# Patient Record
Sex: MALE | Race: OTHER | HISPANIC OR LATINO | Employment: FULL TIME | URBAN - NONMETROPOLITAN AREA
[De-identification: names, ages, dates, MRNs, and addresses within clinical notes are randomized per-mention and may not be internally consistent; named-entity substitution may affect disease eponyms.]

---

## 2022-01-05 ENCOUNTER — HOSPITAL ENCOUNTER (EMERGENCY)
Facility: HOSPITAL | Age: 22
Discharge: HOME | End: 2022-01-05
Attending: EMERGENCY MEDICINE | Admitting: EMERGENCY MEDICINE
Payer: COMMERCIAL

## 2022-01-05 ENCOUNTER — APPOINTMENT (EMERGENCY)
Dept: CT IMAGING | Facility: HOSPITAL | Age: 22
End: 2022-01-05
Payer: COMMERCIAL

## 2022-01-05 VITALS
WEIGHT: 180 LBS | BODY MASS INDEX: 29.99 KG/M2 | HEIGHT: 65 IN | TEMPERATURE: 98.2 F | RESPIRATION RATE: 20 BRPM | SYSTOLIC BLOOD PRESSURE: 130 MMHG | DIASTOLIC BLOOD PRESSURE: 87 MMHG | HEART RATE: 51 BPM | OXYGEN SATURATION: 99 %

## 2022-01-05 DIAGNOSIS — N13.30 HYDROURETERONEPHROSIS: Primary | ICD-10-CM

## 2022-01-05 DIAGNOSIS — N39.0 UTI (URINARY TRACT INFECTION): ICD-10-CM

## 2022-01-05 DIAGNOSIS — N20.1 BILATERAL URETERAL CALCULI: ICD-10-CM

## 2022-01-05 LAB
ANION GAP SERPL CALCULATED.3IONS-SCNC: 12 MMOL/L (ref 4–13)
ANISOCYTOSIS BLD QL SMEAR: PRESENT
BACTERIA UR QL AUTO: ABNORMAL /HPF
BASOPHILS # BLD MANUAL: 0 THOUSAND/UL (ref 0–0.1)
BASOPHILS NFR MAR MANUAL: 0 % (ref 0–1)
BILIRUB UR QL STRIP: NEGATIVE
BUN SERPL-MCNC: 16 MG/DL (ref 5–25)
CALCIUM SERPL-MCNC: 9 MG/DL (ref 8.3–10.1)
CHLORIDE SERPL-SCNC: 105 MMOL/L (ref 100–108)
CLARITY UR: ABNORMAL
CO2 SERPL-SCNC: 24 MMOL/L (ref 21–32)
COLOR UR: YELLOW
CREAT SERPL-MCNC: 1.37 MG/DL (ref 0.6–1.3)
EOSINOPHIL # BLD MANUAL: 0 THOUSAND/UL (ref 0–0.4)
EOSINOPHIL NFR BLD MANUAL: 0 % (ref 0–6)
ERYTHROCYTE [DISTWIDTH] IN BLOOD BY AUTOMATED COUNT: 11.9 % (ref 11.6–15.1)
GFR SERPL CREATININE-BSD FRML MDRD: 72 ML/MIN/1.73SQ M
GLUCOSE SERPL-MCNC: 91 MG/DL (ref 65–140)
GLUCOSE UR STRIP-MCNC: NEGATIVE MG/DL
HCT VFR BLD AUTO: 42.5 % (ref 36.5–49.3)
HGB BLD-MCNC: 14.8 G/DL (ref 12–17)
HGB UR QL STRIP.AUTO: ABNORMAL
KETONES UR STRIP-MCNC: NEGATIVE MG/DL
LEUKOCYTE ESTERASE UR QL STRIP: NEGATIVE
LYMPHOCYTES # BLD AUTO: 1.73 THOUSAND/UL (ref 0.6–4.47)
LYMPHOCYTES # BLD AUTO: 19 % (ref 14–44)
MCH RBC QN AUTO: 30.1 PG (ref 26.8–34.3)
MCHC RBC AUTO-ENTMCNC: 34.8 G/DL (ref 31.4–37.4)
MCV RBC AUTO: 86 FL (ref 82–98)
MONOCYTES # BLD AUTO: 1.55 THOUSAND/UL (ref 0–1.22)
MONOCYTES NFR BLD: 17 % (ref 4–12)
MUCOUS THREADS UR QL AUTO: ABNORMAL
NEUTROPHILS # BLD MANUAL: 5.37 THOUSAND/UL (ref 1.85–7.62)
NEUTS BAND NFR BLD MANUAL: 1 % (ref 0–8)
NEUTS SEG NFR BLD AUTO: 58 % (ref 43–75)
NITRITE UR QL STRIP: NEGATIVE
NON-SQ EPI CELLS URNS QL MICRO: ABNORMAL /HPF
PH UR STRIP.AUTO: 6.5 [PH]
PLATELET # BLD AUTO: 246 THOUSANDS/UL (ref 149–390)
PLATELET BLD QL SMEAR: ADEQUATE
PMV BLD AUTO: 9.2 FL (ref 8.9–12.7)
POTASSIUM SERPL-SCNC: 3.3 MMOL/L (ref 3.5–5.3)
PROT UR STRIP-MCNC: ABNORMAL MG/DL
RBC # BLD AUTO: 4.92 MILLION/UL (ref 3.88–5.62)
RBC #/AREA URNS AUTO: ABNORMAL /HPF
SODIUM SERPL-SCNC: 141 MMOL/L (ref 136–145)
SP GR UR STRIP.AUTO: 1.02 (ref 1–1.03)
UROBILINOGEN UR QL STRIP.AUTO: 0.2 E.U./DL
VARIANT LYMPHS # BLD AUTO: 5 %
WBC # BLD AUTO: 9.11 THOUSAND/UL (ref 4.31–10.16)
WBC #/AREA URNS AUTO: ABNORMAL /HPF

## 2022-01-05 PROCEDURE — 81001 URINALYSIS AUTO W/SCOPE: CPT | Performed by: PHYSICIAN ASSISTANT

## 2022-01-05 PROCEDURE — 96361 HYDRATE IV INFUSION ADD-ON: CPT

## 2022-01-05 PROCEDURE — 99284 EMERGENCY DEPT VISIT MOD MDM: CPT | Performed by: PHYSICIAN ASSISTANT

## 2022-01-05 PROCEDURE — 36415 COLL VENOUS BLD VENIPUNCTURE: CPT | Performed by: PHYSICIAN ASSISTANT

## 2022-01-05 PROCEDURE — 85007 BL SMEAR W/DIFF WBC COUNT: CPT | Performed by: PHYSICIAN ASSISTANT

## 2022-01-05 PROCEDURE — G1004 CDSM NDSC: HCPCS

## 2022-01-05 PROCEDURE — 96375 TX/PRO/DX INJ NEW DRUG ADDON: CPT

## 2022-01-05 PROCEDURE — 80048 BASIC METABOLIC PNL TOTAL CA: CPT | Performed by: PHYSICIAN ASSISTANT

## 2022-01-05 PROCEDURE — 99284 EMERGENCY DEPT VISIT MOD MDM: CPT

## 2022-01-05 PROCEDURE — 85027 COMPLETE CBC AUTOMATED: CPT | Performed by: PHYSICIAN ASSISTANT

## 2022-01-05 PROCEDURE — 74176 CT ABD & PELVIS W/O CONTRAST: CPT

## 2022-01-05 PROCEDURE — 96365 THER/PROPH/DIAG IV INF INIT: CPT

## 2022-01-05 RX ORDER — TAMSULOSIN HYDROCHLORIDE 0.4 MG/1
0.4 CAPSULE ORAL ONCE
Status: COMPLETED | OUTPATIENT
Start: 2022-01-05 | End: 2022-01-05

## 2022-01-05 RX ORDER — CEPHALEXIN 500 MG/1
500 CAPSULE ORAL EVERY 6 HOURS SCHEDULED
Qty: 28 CAPSULE | Refills: 0 | Status: SHIPPED | OUTPATIENT
Start: 2022-01-05 | End: 2022-01-12

## 2022-01-05 RX ORDER — TAMSULOSIN HYDROCHLORIDE 0.4 MG/1
0.4 CAPSULE ORAL
Qty: 4 CAPSULE | Refills: 0 | Status: ON HOLD | OUTPATIENT
Start: 2022-01-05 | End: 2022-01-24 | Stop reason: ALTCHOICE

## 2022-01-05 RX ORDER — OXYCODONE HYDROCHLORIDE AND ACETAMINOPHEN 5; 325 MG/1; MG/1
1 TABLET ORAL EVERY 4 HOURS PRN
Qty: 15 TABLET | Refills: 0 | Status: ON HOLD | OUTPATIENT
Start: 2022-01-05 | End: 2022-01-24 | Stop reason: ALTCHOICE

## 2022-01-05 RX ORDER — KETOROLAC TROMETHAMINE 30 MG/ML
30 INJECTION, SOLUTION INTRAMUSCULAR; INTRAVENOUS ONCE
Status: COMPLETED | OUTPATIENT
Start: 2022-01-05 | End: 2022-01-05

## 2022-01-05 RX ORDER — CEFTRIAXONE 1 G/50ML
1000 INJECTION, SOLUTION INTRAVENOUS ONCE
Status: COMPLETED | OUTPATIENT
Start: 2022-01-05 | End: 2022-01-05

## 2022-01-05 RX ADMIN — KETOROLAC TROMETHAMINE 30 MG: 30 INJECTION, SOLUTION INTRAMUSCULAR; INTRAVENOUS at 14:14

## 2022-01-05 RX ADMIN — TAMSULOSIN HYDROCHLORIDE 0.4 MG: 0.4 CAPSULE ORAL at 15:39

## 2022-01-05 RX ADMIN — CEFTRIAXONE 1000 MG: 1 INJECTION, SOLUTION INTRAVENOUS at 15:39

## 2022-01-05 RX ADMIN — SODIUM CHLORIDE 1000 ML: 0.9 INJECTION, SOLUTION INTRAVENOUS at 13:37

## 2022-01-05 NOTE — ED PROVIDER NOTES
History  Chief Complaint   Patient presents with    Flank Pain     left flank pain; sharp shooting x1 week; ibuprofen taken with no relief; hx of kidney stones      Patient presents to the emergency department today for evaluation of left-sided flank pain and left lower quadrant abdominal pain  This is a very pleasant 42-year-old male who states about a week ago he began with the left-sided flank pain radiating to left side of the abdomen, intermittent, initially associated with nausea fevers however those symptoms resolved  He did admit to a few drops of blood in the urine and dark urine  He also feels though he may need moderately constipated  Self-treated with ibuprofen with no relief  He does have a history of renal calculi prior that required stenting  None       Past Medical History:   Diagnosis Date    Kidney stones        Past Surgical History:   Procedure Laterality Date    KIDNEY STONE SURGERY         History reviewed  No pertinent family history  I have reviewed and agree with the history as documented  E-Cigarette/Vaping     E-Cigarette/Vaping Substances     Social History     Tobacco Use    Smoking status: Current Every Day Smoker     Packs/day: 0 50     Types: Cigarettes    Smokeless tobacco: Never Used   Substance Use Topics    Alcohol use: Yes    Drug use: Not Currently       Review of Systems   Constitutional: Negative for chills and fever  HENT: Negative for ear pain and sore throat  Eyes: Negative for pain and visual disturbance  Respiratory: Negative for cough and shortness of breath  Cardiovascular: Negative for chest pain and palpitations  Gastrointestinal: Positive for abdominal pain and constipation  Negative for vomiting  Genitourinary: Positive for flank pain and hematuria  Negative for dysuria  Musculoskeletal: Positive for back pain  Negative for arthralgias  Skin: Negative for color change and rash     Neurological: Negative for seizures and syncope  All other systems reviewed and are negative  Physical Exam  Physical Exam  Vitals and nursing note reviewed  Constitutional:       General: He is not in acute distress  Appearance: Normal appearance  He is well-developed and normal weight  He is not ill-appearing, toxic-appearing or diaphoretic  HENT:      Head: Normocephalic and atraumatic  Eyes:      Conjunctiva/sclera: Conjunctivae normal    Cardiovascular:      Rate and Rhythm: Normal rate and regular rhythm  Heart sounds: No murmur heard  No friction rub  No gallop  Pulmonary:      Effort: Pulmonary effort is normal  No respiratory distress  Breath sounds: Normal breath sounds  No stridor  No wheezing, rhonchi or rales  Chest:      Chest wall: No tenderness  Abdominal:      General: There is no distension  Palpations: Abdomen is soft  There is no mass  Tenderness: There is no abdominal tenderness  There is left CVA tenderness  There is no right CVA tenderness  Musculoskeletal:         General: No deformity or signs of injury  Normal range of motion  Cervical back: Neck supple  Skin:     General: Skin is warm and dry  Neurological:      General: No focal deficit present  Mental Status: He is alert and oriented to person, place, and time  Mental status is at baseline  Psychiatric:         Mood and Affect: Mood normal          Behavior: Behavior normal          Thought Content:  Thought content normal          Judgment: Judgment normal          Vital Signs  ED Triage Vitals [01/05/22 1143]   Temperature Pulse Respirations Blood Pressure SpO2   98 2 °F (36 8 °C) 65 20 126/76 99 %      Temp Source Heart Rate Source Patient Position - Orthostatic VS BP Location FiO2 (%)   Temporal Monitor Sitting Right arm --      Pain Score       10 - Worst Possible Pain           Vitals:    01/05/22 1143 01/05/22 1450   BP: 126/76 130/87   Pulse: 65 (!) 51   Patient Position - Orthostatic VS: Sitting Lying Visual Acuity      ED Medications  Medications   tamsulosin (FLOMAX) capsule 0 4 mg (has no administration in time range)   cefTRIAXone (ROCEPHIN) IVPB (premix in dextrose) 1,000 mg 50 mL (has no administration in time range)   sodium chloride 0 9 % bolus 1,000 mL (0 mL Intravenous Stopped 1/5/22 1452)   ketorolac (TORADOL) injection 30 mg (30 mg Intravenous Given 1/5/22 1414)       Diagnostic Studies  Results Reviewed     Procedure Component Value Units Date/Time    Manual Differential(PHLEBS Do Not Order) [[id number]]  (Abnormal) Collected: 01/05/22 1336    Lab Status: Final result Specimen: Blood from Arm, Left Updated: 01/05/22 1409     Segmented % 58 %      Bands % 1 %      Lymphocytes % 19 %      Monocytes % 17 %      Eosinophils, % 0 %      Basophils % 0 %      Atypical Lymphocytes % 5 %      Absolute Neutrophils 5 37 Thousand/uL      Lymphocytes Absolute 1 73 Thousand/uL      Monocytes Absolute 1 55 Thousand/uL      Eosinophils Absolute 0 00 Thousand/uL      Basophils Absolute 0 00 Thousand/uL      Total Counted --     Anisocytosis Present     Platelet Estimate Adequate    CBC and differential [[id number]]  (Normal) Collected: 01/05/22 1336    Lab Status: Final result Specimen: Blood from Arm, Left Updated: 01/05/22 1409     WBC 9 11 Thousand/uL      RBC 4 92 Million/uL      Hemoglobin 14 8 g/dL      Hematocrit 42 5 %      MCV 86 fL      MCH 30 1 pg      MCHC 34 8 g/dL      RDW 11 9 %      MPV 9 2 fL      Platelets 120 Thousands/uL     Narrative: This is an appended report  These results have been appended to a previously verified report      Urine Microscopic [[id number]]  (Abnormal) Collected: 01/05/22 1338    Lab Status: Final result Specimen: Urine, Clean Catch Updated: 01/05/22 1400     RBC, UA 30-50 /hpf      WBC, UA 4-10 /hpf      Epithelial Cells None Seen /hpf      Bacteria, UA Moderate /hpf      MUCUS THREADS Moderate    Basic metabolic panel [[id number]]  (Abnormal) Collected: 01/05/22 1336    Lab Status: Final result Specimen: Blood from Arm, Left Updated: 01/05/22 1354     Sodium 141 mmol/L      Potassium 3 3 mmol/L      Chloride 105 mmol/L      CO2 24 mmol/L      ANION GAP 12 mmol/L      BUN 16 mg/dL      Creatinine 1 37 mg/dL      Glucose 91 mg/dL      Calcium 9 0 mg/dL      eGFR 72 ml/min/1 73sq m     Narrative:      National Kidney Disease Foundation guidelines for Chronic Kidney Disease (CKD):     Stage 1 with normal or high GFR (GFR > 90 mL/min/1 73 square meters)    Stage 2 Mild CKD (GFR = 60-89 mL/min/1 73 square meters)    Stage 3A Moderate CKD (GFR = 45-59 mL/min/1 73 square meters)    Stage 3B Moderate CKD (GFR = 30-44 mL/min/1 73 square meters)    Stage 4 Severe CKD (GFR = 15-29 mL/min/1 73 square meters)    Stage 5 End Stage CKD (GFR <15 mL/min/1 73 square meters)  Note: GFR calculation is accurate only with a steady state creatinine    UA w Reflex to Microscopic w Reflex to Culture [[id number]]  (Abnormal) Collected: 01/05/22 1338    Lab Status: Final result Specimen: Urine, Clean Catch Updated: 01/05/22 1346     Color, UA Yellow     Clarity, UA Cloudy     Specific Gravity, UA 1 025     pH, UA 6 5     Leukocytes, UA Negative     Nitrite, UA Negative     Protein, UA Trace mg/dl      Glucose, UA Negative mg/dl      Ketones, UA Negative mg/dl      Urobilinogen, UA 0 2 E U /dl      Bilirubin, UA Negative     Blood, UA Large                 CT renal stone study abdomen pelvis wo contrast   Final Result by Seth Epperson MD (01/05 1510)      Obstructive 6 and 3 mm left ureteral calculi causing moderate to severe left hydroureteronephrosis  Obstructive 5 mm proximal right ureteral calculus causing mild right hydronephrosis  The study was marked in [facility] for immediate notification              Workstation performed: [id number]                    Procedures  Procedures         ED Course  ED Course as of 01/05/22 1534   Wed Jan 05, 2022   1344 WBC: 9 11   1344 Hemoglobin: 14 8   1344 Platelet Count: 176   1413 MUCUS THREADS(!): Moderate   1413 Bacteria, UA(!): Moderate   1413 Epithelial Cells: None Seen   1413 WBC, UA(!): 4-10   1520   IMPRESSION:     Obstructive 6 and 3 mm left ureteral calculi causing moderate to severe left hydroureteronephrosis      Obstructive 5 mm proximal right ureteral calculus causing mild right hydronephrosis  1521 Bacteria, UA(!): Moderate   1521 WBC, UA(!): 4-10   1521 I updated the patient on the diagnosis, there is some bacteria as well as 3 separate ureteral calculi, at this point he is nontoxic appearing and pain is controlled here in the ER he is agreeable to outpatient management he knows that if the symptoms worsen he will return to the ER likely will require stent  MDM    Disposition  Final diagnoses:   Hydroureteronephrosis   Bilateral ureteral calculi   UTI (urinary tract infection)     Time reflects when diagnosis was documented in both MDM as applicable and the Disposition within this note     Time User Action Codes Description Comment    1/5/2022  3:31 PM Klaudia Youssefer Add [N13 30] Hydroureteronephrosis     1/5/2022  3:31 PM Sharlotte Semen D Add [N20 1] Bilateral ureteral calculi     1/5/2022  3:31 PM Sharlotte Semen D Add [N39 0] UTI (urinary tract infection)       ED Disposition     ED Disposition Condition Date/Time Comment    Discharge Stable Wed Jan 5, 2022  3:31 PM [place] Lighter discharge to home/self care              Follow-up Information     Follow up With Specialties Details Why Contact Info Additional [address] [facility] [address] [place] [facility] Urology Call today  [address] [phone]  [phone]  [facility], [address] [place] [address], [address] [place] [address]          Patient's Medications   Discharge Prescriptions    CEPHALEXIN (KEFLEX) 500 MG CAPSULE    Take 1 capsule (500 mg total) by mouth every 6 (six) hours for 7 days       Start Date: 1/5/2022  End Date: 1/12/2022       Order Dose: 500 mg       Quantity: 28 capsule    Refills: 0    OXYCODONE-ACETAMINOPHEN (PERCOCET) 5-325 MG PER TABLET    Take 1 tablet by mouth every 4 (four) hours as needed for moderate pain Max Daily Amount: 6 tablets       Start Date: 1/5/2022  End Date: --       Order Dose: 1 tablet       Quantity: 15 tablet    Refills: 0    TAMSULOSIN (FLOMAX) 0 4 MG    Take 1 capsule (0 4 mg total) by mouth daily with dinner       Start Date: 1/5/2022  End Date: --       Order Dose: 0 4 mg       Quantity: 4 capsule    Refills: 0       No discharge procedures on file      PDMP Review     None          ED Provider  Electronically Signed by           Navarro Kerr PA-C  01/05/22 3659

## 2022-01-23 ENCOUNTER — HOSPITAL ENCOUNTER (EMERGENCY)
Facility: HOSPITAL | Age: 22
Discharge: ANOTHER HOSPITAL | End: 2022-01-24
Attending: EMERGENCY MEDICINE
Payer: COMMERCIAL

## 2022-01-23 ENCOUNTER — APPOINTMENT (EMERGENCY)
Dept: CT IMAGING | Facility: HOSPITAL | Age: 22
End: 2022-01-23
Payer: COMMERCIAL

## 2022-01-23 VITALS
HEIGHT: 65 IN | TEMPERATURE: 97.7 F | HEART RATE: 62 BPM | SYSTOLIC BLOOD PRESSURE: 121 MMHG | DIASTOLIC BLOOD PRESSURE: 58 MMHG | OXYGEN SATURATION: 96 % | RESPIRATION RATE: 18 BRPM | BODY MASS INDEX: 29.97 KG/M2 | WEIGHT: 179.9 LBS

## 2022-01-23 DIAGNOSIS — N13.30 HYDROURETERONEPHROSIS: ICD-10-CM

## 2022-01-23 DIAGNOSIS — D72.829 LEUKOCYTOSIS: ICD-10-CM

## 2022-01-23 DIAGNOSIS — N17.9 ACUTE KIDNEY INJURY (HCC): Primary | ICD-10-CM

## 2022-01-23 DIAGNOSIS — N20.1 URETERAL CALCULI: ICD-10-CM

## 2022-01-23 PROBLEM — N13.2 HYDRONEPHROSIS WITH URINARY OBSTRUCTION DUE TO RENAL CALCULUS: Status: ACTIVE | Noted: 2022-01-23

## 2022-01-23 PROBLEM — N39.0 UTI (URINARY TRACT INFECTION): Status: ACTIVE | Noted: 2022-01-23

## 2022-01-23 LAB
ANION GAP SERPL CALCULATED.3IONS-SCNC: 10 MMOL/L (ref 4–13)
ANION GAP SERPL CALCULATED.3IONS-SCNC: 9 MMOL/L (ref 4–13)
BACTERIA UR QL AUTO: ABNORMAL /HPF
BASOPHILS # BLD AUTO: 0.03 THOUSANDS/ΜL (ref 0–0.1)
BASOPHILS NFR BLD AUTO: 0 % (ref 0–1)
BILIRUB UR QL STRIP: NEGATIVE
BUN SERPL-MCNC: 24 MG/DL (ref 5–25)
BUN SERPL-MCNC: 26 MG/DL (ref 5–25)
CALCIUM SERPL-MCNC: 8.4 MG/DL (ref 8.3–10.1)
CALCIUM SERPL-MCNC: 9.2 MG/DL (ref 8.3–10.1)
CHLORIDE SERPL-SCNC: 100 MMOL/L (ref 100–108)
CHLORIDE SERPL-SCNC: 97 MMOL/L (ref 100–108)
CLARITY UR: CLEAR
CO2 SERPL-SCNC: 24 MMOL/L (ref 21–32)
CO2 SERPL-SCNC: 26 MMOL/L (ref 21–32)
COLOR UR: YELLOW
CREAT SERPL-MCNC: 1.71 MG/DL (ref 0.6–1.3)
CREAT SERPL-MCNC: 2.03 MG/DL (ref 0.6–1.3)
EOSINOPHIL # BLD AUTO: 0.01 THOUSAND/ΜL (ref 0–0.61)
EOSINOPHIL NFR BLD AUTO: 0 % (ref 0–6)
ERYTHROCYTE [DISTWIDTH] IN BLOOD BY AUTOMATED COUNT: 12.8 % (ref 11.6–15.1)
GFR SERPL CREATININE-BSD FRML MDRD: 44 ML/MIN/1.73SQ M
GFR SERPL CREATININE-BSD FRML MDRD: 55 ML/MIN/1.73SQ M
GLUCOSE SERPL-MCNC: 104 MG/DL (ref 65–140)
GLUCOSE SERPL-MCNC: 112 MG/DL (ref 65–140)
GLUCOSE UR STRIP-MCNC: NEGATIVE MG/DL
HCT VFR BLD AUTO: 38.5 % (ref 36.5–49.3)
HGB BLD-MCNC: 13.1 G/DL (ref 12–17)
HGB UR QL STRIP.AUTO: ABNORMAL
IMM GRANULOCYTES # BLD AUTO: 0.13 THOUSAND/UL (ref 0–0.2)
IMM GRANULOCYTES NFR BLD AUTO: 1 % (ref 0–2)
KETONES UR STRIP-MCNC: NEGATIVE MG/DL
LACTATE SERPL-SCNC: 1.4 MMOL/L (ref 0.5–2)
LEUKOCYTE ESTERASE UR QL STRIP: NEGATIVE
LYMPHOCYTES # BLD AUTO: 1.06 THOUSANDS/ΜL (ref 0.6–4.47)
LYMPHOCYTES NFR BLD AUTO: 5 % (ref 14–44)
MCH RBC QN AUTO: 30.3 PG (ref 26.8–34.3)
MCHC RBC AUTO-ENTMCNC: 34 G/DL (ref 31.4–37.4)
MCV RBC AUTO: 89 FL (ref 82–98)
MONOCYTES # BLD AUTO: 1.45 THOUSAND/ΜL (ref 0.17–1.22)
MONOCYTES NFR BLD AUTO: 6 % (ref 4–12)
MUCOUS THREADS UR QL AUTO: ABNORMAL
NEUTROPHILS # BLD AUTO: 19.91 THOUSANDS/ΜL (ref 1.85–7.62)
NEUTS SEG NFR BLD AUTO: 88 % (ref 43–75)
NITRITE UR QL STRIP: NEGATIVE
NON-SQ EPI CELLS URNS QL MICRO: ABNORMAL /HPF
NRBC BLD AUTO-RTO: 0 /100 WBCS
PH UR STRIP.AUTO: 6 [PH]
PLATELET # BLD AUTO: 315 THOUSANDS/UL (ref 149–390)
PMV BLD AUTO: 9.3 FL (ref 8.9–12.7)
POTASSIUM SERPL-SCNC: 3.8 MMOL/L (ref 3.5–5.3)
POTASSIUM SERPL-SCNC: 3.8 MMOL/L (ref 3.5–5.3)
PROT UR STRIP-MCNC: NEGATIVE MG/DL
RBC # BLD AUTO: 4.33 MILLION/UL (ref 3.88–5.62)
RBC #/AREA URNS AUTO: ABNORMAL /HPF
SODIUM SERPL-SCNC: 132 MMOL/L (ref 136–145)
SODIUM SERPL-SCNC: 134 MMOL/L (ref 136–145)
SP GR UR STRIP.AUTO: 1.01 (ref 1–1.03)
UROBILINOGEN UR QL STRIP.AUTO: 0.2 E.U./DL
WBC # BLD AUTO: 22.59 THOUSAND/UL (ref 4.31–10.16)
WBC #/AREA URNS AUTO: ABNORMAL /HPF

## 2022-01-23 PROCEDURE — 96361 HYDRATE IV INFUSION ADD-ON: CPT

## 2022-01-23 PROCEDURE — 85025 COMPLETE CBC W/AUTO DIFF WBC: CPT | Performed by: PHYSICIAN ASSISTANT

## 2022-01-23 PROCEDURE — 99285 EMERGENCY DEPT VISIT HI MDM: CPT

## 2022-01-23 PROCEDURE — G1004 CDSM NDSC: HCPCS

## 2022-01-23 PROCEDURE — 83605 ASSAY OF LACTIC ACID: CPT | Performed by: PHYSICIAN ASSISTANT

## 2022-01-23 PROCEDURE — 99284 EMERGENCY DEPT VISIT MOD MDM: CPT | Performed by: PHYSICIAN ASSISTANT

## 2022-01-23 PROCEDURE — 96365 THER/PROPH/DIAG IV INF INIT: CPT

## 2022-01-23 PROCEDURE — 74176 CT ABD & PELVIS W/O CONTRAST: CPT

## 2022-01-23 PROCEDURE — 80048 BASIC METABOLIC PNL TOTAL CA: CPT | Performed by: PHYSICIAN ASSISTANT

## 2022-01-23 PROCEDURE — 81001 URINALYSIS AUTO W/SCOPE: CPT | Performed by: PHYSICIAN ASSISTANT

## 2022-01-23 PROCEDURE — 96375 TX/PRO/DX INJ NEW DRUG ADDON: CPT

## 2022-01-23 PROCEDURE — 36415 COLL VENOUS BLD VENIPUNCTURE: CPT | Performed by: PHYSICIAN ASSISTANT

## 2022-01-23 RX ORDER — ONDANSETRON 2 MG/ML
4 INJECTION INTRAMUSCULAR; INTRAVENOUS ONCE
Status: COMPLETED | OUTPATIENT
Start: 2022-01-23 | End: 2022-01-23

## 2022-01-23 RX ORDER — CEFTRIAXONE 1 G/50ML
1000 INJECTION, SOLUTION INTRAVENOUS ONCE
Status: COMPLETED | OUTPATIENT
Start: 2022-01-23 | End: 2022-01-23

## 2022-01-23 RX ORDER — HYDROMORPHONE HCL/PF 1 MG/ML
1 SYRINGE (ML) INJECTION ONCE
Status: COMPLETED | OUTPATIENT
Start: 2022-01-23 | End: 2022-01-23

## 2022-01-23 RX ORDER — KETOROLAC TROMETHAMINE 30 MG/ML
30 INJECTION, SOLUTION INTRAMUSCULAR; INTRAVENOUS ONCE
Status: COMPLETED | OUTPATIENT
Start: 2022-01-23 | End: 2022-01-23

## 2022-01-23 RX ORDER — TAMSULOSIN HYDROCHLORIDE 0.4 MG/1
0.4 CAPSULE ORAL ONCE
Status: COMPLETED | OUTPATIENT
Start: 2022-01-23 | End: 2022-01-23

## 2022-01-23 RX ORDER — SODIUM CHLORIDE 9 MG/ML
125 INJECTION, SOLUTION INTRAVENOUS CONTINUOUS
Status: DISCONTINUED | OUTPATIENT
Start: 2022-01-23 | End: 2022-01-24 | Stop reason: HOSPADM

## 2022-01-23 RX ADMIN — HYDROMORPHONE HYDROCHLORIDE 1 MG: 1 INJECTION, SOLUTION INTRAMUSCULAR; INTRAVENOUS; SUBCUTANEOUS at 16:52

## 2022-01-23 RX ADMIN — ONDANSETRON 4 MG: 2 INJECTION INTRAMUSCULAR; INTRAVENOUS at 16:52

## 2022-01-23 RX ADMIN — SODIUM CHLORIDE 1000 ML: 0.9 INJECTION, SOLUTION INTRAVENOUS at 16:52

## 2022-01-23 RX ADMIN — SODIUM CHLORIDE 1000 ML: 0.9 INJECTION, SOLUTION INTRAVENOUS at 17:49

## 2022-01-23 RX ADMIN — TAMSULOSIN HYDROCHLORIDE 0.4 MG: 0.4 CAPSULE ORAL at 16:53

## 2022-01-23 RX ADMIN — KETOROLAC TROMETHAMINE 30 MG: 30 INJECTION, SOLUTION INTRAMUSCULAR at 16:52

## 2022-01-23 RX ADMIN — SODIUM CHLORIDE 125 ML/HR: 0.9 INJECTION, SOLUTION INTRAVENOUS at 19:46

## 2022-01-23 RX ADMIN — CEFTRIAXONE 1000 MG: 1 INJECTION, SOLUTION INTRAVENOUS at 17:49

## 2022-01-23 NOTE — QUICK NOTE
Contacted by [facility] about this patient  19year old with recurrent stones  5,mm right proximal stone with perinephric stranding and leukocytosis  Afebrile and hemodynamically stable per report  Patient will require admission for cystoscopy and stent placement  I have requested transfer to our [facility]  I recommend that the patient be transported tonight    I have scheduled him for stent placement tomorrow 1/24 at 07:30    [facility] admission    If the patient becomes febrile or demonstrates concerning hemodynamics, please contact me overnight and I will plan to stent him immediately    SHould remain NPO after midnight    Full consult to follow

## 2022-01-23 NOTE — EMTALA/ACUTE CARE TRANSFER
[address] [facility] EMERGENCY DEPARTMENT  [address] [place] [id number]  Dept: [phone]      EMTALA TRANSFER CONSENT    NAME Syed Ac                                         2000                              MRN [id number]    I have been informed of my rights regarding examination, treatment, and transfer   by Dr Wilber Darden DO    Benefits:  urology    Risks:  Transport Risks      Consent for Transfer:  I acknowledge that my medical condition has been evaluated and explained to me by the emergency department physician or other qualified medical person and/or my attending physician, who has recommended that I be transferred to the service of   Dr Joseph Soliz at  [facility] AND [facility]  The above potential benefits of such transfer, the potential risks associated with such transfer, and the probable risks of not being transferred have been explained to me, and I fully understand them  The doctor has explained that, in my case, the benefits of transfer outweigh the risks  I agree to be transferred  I authorize the performance of emergency medical procedures and treatments upon me in both transit and upon arrival at the receiving facility  Additionally, I authorize the release of any and all medical records to the receiving facility and request they be transported with me, if possible  I understand that the safest mode of transportation during a medical emergency is an ambulance and that the Hospital advocates the use of this mode of transport  Risks of traveling to the receiving facility by car, including absence of medical control, life sustaining equipment, such as oxygen, and medical personnel has been explained to me and I fully understand them  (JONES CORRECT BOX BELOW)  [  ]  I consent to the stated transfer and to be transported by ambulance/helicopter    [  ]  I consent to the stated transfer, but refuse transportation by ambulance and accept full responsibility for my transportation by car  I understand the risks of non-ambulance transfers and I exonerate the Hospital and its staff from any deterioration in my condition that results from this refusal     X___________________________________________    DATE  22  TIME________  Signature of patient or legally responsible individual signing on patient behalf           RELATIONSHIP TO PATIENT_________________________          Provider Certification    NAME Umer Dubon                                         2000                              MRN [id number]    A medical screening exam was performed on the above named patient  Based on the examination:    Condition Necessitating Transfer The primary encounter diagnosis was Acute kidney injury ([facility] [address] )  Diagnoses of Leukocytosis, Hydroureteronephrosis, and Ureteral calculi were also pertinent to this visit  Patient Condition:  Stable    Reason for Transfer:  Urology    Transfer Requirements: Facility  \[facility]\""   · Space available and qualified personnel available for treatment as acknowledged by  Raquel Multani  · Agreed to accept transfer and to provide appropriate medical treatment as acknowledged by          · Appropriate medical records of the examination and treatment of the patient are provided at the time of transfer   [address], [address] _______  · Transfer will be performed by qualified personnel from    and appropriate transfer equipment as required, including the use of necessary and appropriate life support measures      Provider Certification: I have examined the patient and explained the following risks and benefits of being transferred/refusing transfer to the patient/family:         Based on these reasonable risks and benefits to the patient and/or the unborn child(marko), and based upon the information available at the time of the patients examination, I certify that the medical benefits reasonably to be expected from the provision of appropriate medical treatments at another medical facility outweigh the increasing risks, if any, to the individuals medical condition, and in the case of labor to the unborn child, from effecting the transfer      X____________________________________________ DATE 01/23/22        TIME_______      ORIGINAL - SEND TO MEDICAL RECORDS   COPY - SEND WITH PATIENT DURING TRANSFER

## 2022-01-23 NOTE — ED PROVIDER NOTES
History  Chief Complaint   Patient presents with    Flank Pain     pt complaining of R flank pain since last night, feels that he passed a stone, PMH of Kidny stones     Patient presents to the emergency department today for evaluation right-sided flank pain  This is a very pleasant 47-year-old male who presents via private vehicle with family  He states that he had a history of renal calculi few weeks ago, symptoms went away with some Flomax and pain medicine, he states yesterday the pain returned it is right-sided high in the flank associated with hematuria as well as nausea and vomiting  Prior to Admission Medications   Prescriptions Last Dose Informant Patient Reported? Taking?   oxyCODONE-acetaminophen (Percocet) 5-325 mg per tablet 1/23/2022 at Unknown time  No Yes   Sig: Take 1 tablet by mouth every 4 (four) hours as needed for moderate pain Max Daily Amount: 6 tablets   tamsulosin (FLOMAX) 0 4 mg 1/23/2022 at Unknown time  No Yes   Sig: Take 1 capsule (0 4 mg total) by mouth daily with dinner      Facility-Administered Medications: None       Past Medical History:   Diagnosis Date    Kidney stones        Past Surgical History:   Procedure Laterality Date    KIDNEY STONE SURGERY         History reviewed  No pertinent family history  I have reviewed and agree with the history as documented  E-Cigarette/Vaping     E-Cigarette/Vaping Substances     Social History     Tobacco Use    Smoking status: Current Every Day Smoker     Packs/day: 0 50     Types: Cigarettes    Smokeless tobacco: Never Used   Substance Use Topics    Alcohol use: Yes    Drug use: Not Currently     Types: Marijuana       Review of Systems   Constitutional: Negative for chills and fever  HENT: Negative for ear pain and sore throat  Eyes: Negative for pain and visual disturbance  Respiratory: Negative for cough and shortness of breath  Cardiovascular: Negative for chest pain and palpitations     Gastrointestinal: Positive for nausea and vomiting  Negative for abdominal pain  Genitourinary: Positive for flank pain and hematuria  Negative for dysuria  Musculoskeletal: Negative for arthralgias and back pain  Skin: Negative for color change and rash  Neurological: Negative for seizures and syncope  All other systems reviewed and are negative  Physical Exam  Physical Exam  Vitals reviewed  Constitutional:       General: He is not in acute distress  Appearance: He is well-developed  He is not ill-appearing or diaphoretic  HENT:      Right Ear: External ear normal  No swelling  Tympanic membrane is not bulging  Left Ear: External ear normal  No swelling  Tympanic membrane is not bulging  Nose: Nose normal       Mouth/Throat:      Pharynx: No oropharyngeal exudate  Eyes:      General: Lids are normal       Conjunctiva/sclera: Conjunctivae normal       Pupils: Pupils are equal, round, and reactive to light  Neck:      Thyroid: No thyromegaly  Vascular: No JVD  Trachea: No tracheal deviation  Cardiovascular:      Rate and Rhythm: Normal rate and regular rhythm  Pulses: Normal pulses  Heart sounds: Normal heart sounds  No murmur heard  No friction rub  No gallop  Pulmonary:      Effort: Pulmonary effort is normal  No respiratory distress  Breath sounds: Normal breath sounds  No stridor  No wheezing or rales  Chest:      Chest wall: No tenderness  Abdominal:      General: Bowel sounds are normal  There is no distension  Palpations: Abdomen is soft  There is no mass  Tenderness: There is no abdominal tenderness  There is right CVA tenderness  There is no guarding or rebound  Negative signs include Frank's sign  Hernia: No hernia is present  Musculoskeletal:         General: No tenderness  Normal range of motion  Cervical back: Normal range of motion and neck supple  No edema  Normal range of motion     Lymphadenopathy:      Cervical: No cervical adenopathy  Skin:     General: Skin is warm and dry  Capillary Refill: Capillary refill takes less than 2 seconds  Coloration: Skin is not pale  Findings: No erythema or rash  Neurological:      Mental Status: He is alert and oriented to person, place, and time  GCS: GCS eye subscore is 4  GCS verbal subscore is 5  GCS motor subscore is 6  Cranial Nerves: No cranial nerve deficit  Sensory: No sensory deficit  Deep Tendon Reflexes: Reflexes are normal and symmetric  Psychiatric:         Mood and Affect: Mood normal          Speech: Speech normal          Behavior: Behavior normal          Thought Content:  Thought content normal          Judgment: Judgment normal          Vital Signs  ED Triage Vitals   Temperature Pulse Respirations Blood Pressure SpO2   01/23/22 1614 01/23/22 1614 01/23/22 1614 01/23/22 1619 01/23/22 1614   97 7 °F (36 5 °C) 66 18 162/89 98 %      Temp Source Heart Rate Source Patient Position - Orthostatic VS BP Location FiO2 (%)   01/23/22 1614 01/23/22 1614 01/23/22 1945 01/23/22 1945 --   Temporal Monitor Lying Right arm       Pain Score       01/23/22 1614       8           Vitals:    01/23/22 1614 01/23/22 1619 01/23/22 1945   BP:  162/89 145/84   Pulse: 66  62   Patient Position - Orthostatic VS:   Lying         Visual Acuity      ED Medications  Medications   sodium chloride 0 9 % infusion (125 mL/hr Intravenous New Bag 1/23/22 1946)   sodium chloride 0 9 % bolus 1,000 mL (0 mL Intravenous Stopped 1/23/22 1752)   ketorolac (TORADOL) injection 30 mg (30 mg Intravenous Given 1/23/22 1652)   HYDROmorphone (DILAUDID) injection 1 mg (1 mg Intravenous Given 1/23/22 1652)   ondansetron (ZOFRAN) injection 4 mg (4 mg Intravenous Given 1/23/22 1652)   tamsulosin (FLOMAX) capsule 0 4 mg (0 4 mg Oral Given 1/23/22 1653)   cefTRIAXone (ROCEPHIN) IVPB (premix in dextrose) 1,000 mg 50 mL (0 mg Intravenous Stopped 1/23/22 1819)   sodium chloride 0 9 % bolus 1,000 mL (0 mL Intravenous Stopped 1/23/22 1849)       Diagnostic Studies  Results Reviewed     Procedure Component Value Units Date/Time    Urine Microscopic [[id number]]  (Abnormal) Collected: 01/23/22 1949    Lab Status: Final result Specimen: Urine, Clean Catch Updated: 01/23/22 2008     RBC, UA Innumerable /hpf      WBC, UA None Seen /hpf      Epithelial Cells None Seen /hpf      Bacteria, UA Occasional /hpf      MUCUS THREADS Occasional    UA w Reflex to Microscopic w Reflex to Culture [[id number]]  (Abnormal) Collected: 01/23/22 1949    Lab Status: Final result Specimen: Urine, Clean Catch Updated: 01/23/22 2003     Color, UA Yellow     Clarity, UA Clear     Specific Gravity, UA 1 015     pH, UA 6 0     Leukocytes, UA Negative     Nitrite, UA Negative     Protein, UA Negative mg/dl      Glucose, UA Negative mg/dl      Ketones, UA Negative mg/dl      Urobilinogen, UA 0 2 E U /dl      Bilirubin, UA Negative     Blood, UA Large    Lactic acid [[id number]]  (Normal) Collected: 01/23/22 1843    Lab Status: Final result Specimen: Blood from Arm, Right Updated: 01/23/22 1914     LACTIC ACID 1 4 mmol/L     Narrative:      Result may be elevated if tourniquet was used during collection      Basic metabolic panel [[id number]]  (Abnormal) Collected: 01/23/22 1843    Lab Status: Final result Specimen: Blood from Arm, Right Updated: 01/23/22 1910     Sodium 134 mmol/L      Potassium 3 8 mmol/L      Chloride 100 mmol/L      CO2 24 mmol/L      ANION GAP 10 mmol/L      BUN 24 mg/dL      Creatinine 1 71 mg/dL      Glucose 104 mg/dL      Calcium 8 4 mg/dL      eGFR 55 ml/min/1 73sq m     Narrative:      [facility] guidelines for Chronic Kidney Disease (CKD):     Stage 1 with normal or high GFR (GFR > 90 mL/min/1 73 square meters)    Stage 2 Mild CKD (GFR = 60-89 mL/min/1 73 square meters)    Stage 3A Moderate CKD (GFR = 45-59 mL/min/1 73 square meters)    Stage 3B Moderate CKD (GFR = 30-44 mL/min/1 73 square meters)    Stage 4 Severe CKD (GFR = 15-29 mL/min/1 73 square meters)    Stage 5 End Stage CKD (GFR <15 mL/min/1 73 square meters)  Note: GFR calculation is accurate only with a steady state creatinine    Basic metabolic panel [[id number]]  (Abnormal) Collected: 01/23/22 1646    Lab Status: Final result Specimen: Blood from Arm, Left Updated: 01/23/22 1659     Sodium 132 mmol/L      Potassium 3 8 mmol/L      Chloride 97 mmol/L      CO2 26 mmol/L      ANION GAP 9 mmol/L      BUN 26 mg/dL      Creatinine 2 03 mg/dL      Glucose 112 mg/dL      Calcium 9 2 mg/dL      eGFR 44 ml/min/1 73sq m     Narrative:      [employer] guidelines for Chronic Kidney Disease (CKD):     Stage 1 with normal or high GFR (GFR > 90 mL/min/1 73 square meters)    Stage 2 Mild CKD (GFR = 60-89 mL/min/1 73 square meters)    Stage 3A Moderate CKD (GFR = 45-59 mL/min/1 73 square meters)    Stage 3B Moderate CKD (GFR = 30-44 mL/min/1 73 square meters)    Stage 4 Severe CKD (GFR = 15-29 mL/min/1 73 square meters)    Stage 5 End Stage CKD (GFR <15 mL/min/1 73 square meters)  Note: GFR calculation is accurate only with a steady state creatinine    CBC and differential [[id number]]  (Abnormal) Collected: 01/23/22 1646    Lab Status: Final result Specimen: Blood from Arm, Left Updated: 01/23/22 1650     WBC 22 59 Thousand/uL      RBC 4 33 Million/uL      Hemoglobin 13 1 g/dL      Hematocrit 38 5 %      MCV 89 fL      MCH 30 3 pg      MCHC 34 0 g/dL      RDW 12 8 %      MPV 9 3 fL      Platelets 255 Thousands/uL      nRBC 0 /100 WBCs      Neutrophils Relative 88 %      Immat GRANS % 1 %      Lymphocytes Relative 5 %      Monocytes Relative 6 %      Eosinophils Relative 0 %      Basophils Relative 0 %      Neutrophils Absolute 19 91 Thousands/µL      Immature Grans Absolute 0 13 Thousand/uL      Lymphocytes Absolute 1 06 Thousands/µL      Monocytes Absolute 1 45 Thousand/µL      Eosinophils Absolute 0 01 Thousand/µL Basophils Absolute 0 03 Thousands/µL                  CT renal stone study abdomen pelvis wo contrast   ED Interpretation by Sonja Bang PA-C (01/23 1713)   Will await official interpretation however appears to have around a 5 mm midpole right-sided ureteral calculi with associated hydroureteronephrosis, will treat accordingly in the meantime  Final Result by Alin Padgett MD (01/23 1752)         1  Interval migration of the previously present 4 mm proximal right ureteral calculus into the mid ureter with new associated moderate right-sided hydroureteronephrosis  Urological assessment is advised  2   Resolved left-sided hydronephrosis with interval extraction/passing of the previously present left renal collecting system calculi  Workstation performed: [id number]                    Procedures  Procedures         ED Course  ED Course as of 01/23/22 2041   Sun Jan 23, 2022   1624 SpO2: 98 %   1624 Respirations: 18   1624 Pulse: 66   1624 Temperature: 97 7 °F (36 5 °C)   1624 Blood Pressure: 162/89   1706 WBC(!): 22 59   1706 Hemoglobin: 13 1   1706 Neutrophils %(!): 88   1754 IMPRESSION:        1  Interval migration of the previously present 4 mm proximal right ureteral calculus into the mid ureter with new associated moderate right-sided hydroureteronephrosis  Urological assessment is advised  2   Resolved left-sided hydronephrosis with interval extraction/passing of the previously present left renal collecting system calculi  [phone] Per [facility] of Urology patient should be transferred to [facility] in [place], if remains afebrile can place a stent in the morning, if he becomes febrile tonight he will need to be stented tonight     1826 Patient updated and agreeable to transfer, I will add a lactic acid based upon this severe leukocytosis, also will repeat basic metabolic panel to see if kidney function has changed after 2 L of fluids   1827 He is more comfortable at this point from a pain standpoint   1834 Spoke with Woo Harrison from [facility], okay for MS no tele bed under Dr Antonia Babcock  SBIRT 22yo+      Most Recent Value   SBIRT (24 yo +)    In order to provide better care to our patients, we are screening all of our patients for alcohol and drug use  Would it be okay to ask you these screening questions? Yes Filed at: 01/23/2022 1800   Initial Alcohol Screen: US AUDIT-C     1  How often do you have a drink containing alcohol? 0 Filed at: 01/23/2022 1800   2  How many drinks containing alcohol do you have on a typical day you are drinking? 0 Filed at: 01/23/2022 1800   3a  Male UNDER 65: How often do you have five or more drinks on one occasion? 0 Filed at: 01/23/2022 1800   3b  FEMALE Any Age, or MALE 65+: How often do you have 4 or more drinks on one occassion? 0 Filed at: 01/23/2022 1800   Audit-C Score 0 Filed at: 01/23/2022 1800   BILLIE: How many times in the past year have you    Used an illegal drug or used a prescription medication for non-medical reasons? Never Filed at: 01/23/2022 1800                    MDM    Disposition  Final diagnoses:   Acute kidney injury ([address] )   Leukocytosis   Hydroureteronephrosis   Ureteral calculi - Right-sided     Time reflects when diagnosis was documented in both MDM as applicable and the Disposition within this note     Time User Action Codes Description Comment    1/23/2022  5:06 PM Renate SONI Add [N17 9] Acute kidney injury ([address] )     1/23/2022  5:07 PM Elizabeth Labor Add [F58 241] Leukocytosis     1/23/2022  6:38 PM Renate SONI Add [N13 30] Hydroureteronephrosis     1/23/2022  6:38 PM Renate SONI Add [N20 1] Ureteral calculi     1/23/2022  6:38 PM Renate SONI Modify [N20 1] Ureteral calculi Right-sided      ED Disposition     ED Disposition Condition Date/Time Comment    Transfer to Another Facility-In Network  [place] Jan 23, 2022  6:39 PM Jaison Davidsons should be transferred out to [facility]  Follow-up Information    None         Patient's Medications   Discharge Prescriptions    No medications on file       No discharge procedures on file      PDMP Review     None          ED Provider  Electronically Signed by           Monie Bonilla PA-C  01/23/22 2041

## 2022-01-24 ENCOUNTER — TELEPHONE (OUTPATIENT)
Dept: UROLOGY | Facility: CLINIC | Age: 22
End: 2022-01-24

## 2022-01-24 ENCOUNTER — APPOINTMENT (OUTPATIENT)
Dept: RADIOLOGY | Facility: HOSPITAL | Age: 22
End: 2022-01-24
Payer: COMMERCIAL

## 2022-01-24 ENCOUNTER — HOSPITAL ENCOUNTER (OUTPATIENT)
Facility: HOSPITAL | Age: 22
Setting detail: OBSERVATION
Discharge: HOME | End: 2022-01-25
Attending: INTERNAL MEDICINE | Admitting: INTERNAL MEDICINE
Payer: COMMERCIAL

## 2022-01-24 ENCOUNTER — ANESTHESIA (OUTPATIENT)
Dept: PERIOP | Facility: HOSPITAL | Age: 22
End: 2022-01-24
Payer: COMMERCIAL

## 2022-01-24 ENCOUNTER — ANESTHESIA EVENT (OUTPATIENT)
Dept: PERIOP | Facility: HOSPITAL | Age: 22
End: 2022-01-24
Payer: COMMERCIAL

## 2022-01-24 DIAGNOSIS — N17.9 ACUTE KIDNEY INJURY (HCC): ICD-10-CM

## 2022-01-24 DIAGNOSIS — D72.829 LEUKOCYTOSIS: ICD-10-CM

## 2022-01-24 LAB
ANION GAP SERPL CALCULATED.3IONS-SCNC: 4 MMOL/L (ref 4–13)
BASOPHILS # BLD AUTO: 0.02 THOUSANDS/ΜL (ref 0–0.1)
BASOPHILS NFR BLD AUTO: 0 % (ref 0–1)
BUN SERPL-MCNC: 27 MG/DL (ref 5–25)
CALCIUM SERPL-MCNC: 9 MG/DL (ref 8.3–10.1)
CHLORIDE SERPL-SCNC: 105 MMOL/L (ref 100–108)
CO2 SERPL-SCNC: 26 MMOL/L (ref 21–32)
CREAT SERPL-MCNC: 1.8 MG/DL (ref 0.6–1.3)
EOSINOPHIL # BLD AUTO: 0.01 THOUSAND/ΜL (ref 0–0.61)
EOSINOPHIL NFR BLD AUTO: 0 % (ref 0–6)
ERYTHROCYTE [DISTWIDTH] IN BLOOD BY AUTOMATED COUNT: 13.2 % (ref 11.6–15.1)
GFR SERPL CREATININE-BSD FRML MDRD: 51 ML/MIN/1.73SQ M
GLUCOSE P FAST SERPL-MCNC: 94 MG/DL (ref 65–99)
GLUCOSE SERPL-MCNC: 94 MG/DL (ref 65–140)
HCT VFR BLD AUTO: 39.2 % (ref 36.5–49.3)
HGB BLD-MCNC: 12.9 G/DL (ref 12–17)
IMM GRANULOCYTES # BLD AUTO: 0.04 THOUSAND/UL (ref 0–0.2)
IMM GRANULOCYTES NFR BLD AUTO: 0 % (ref 0–2)
LYMPHOCYTES # BLD AUTO: 1.07 THOUSANDS/ΜL (ref 0.6–4.47)
LYMPHOCYTES NFR BLD AUTO: 7 % (ref 14–44)
MCH RBC QN AUTO: 30 PG (ref 26.8–34.3)
MCHC RBC AUTO-ENTMCNC: 32.9 G/DL (ref 31.4–37.4)
MCV RBC AUTO: 91 FL (ref 82–98)
MONOCYTES # BLD AUTO: 0.78 THOUSAND/ΜL (ref 0.17–1.22)
MONOCYTES NFR BLD AUTO: 5 % (ref 4–12)
NEUTROPHILS # BLD AUTO: 12.69 THOUSANDS/ΜL (ref 1.85–7.62)
NEUTS SEG NFR BLD AUTO: 88 % (ref 43–75)
NRBC BLD AUTO-RTO: 0 /100 WBCS
PLATELET # BLD AUTO: 292 THOUSANDS/UL (ref 149–390)
PMV BLD AUTO: 9.3 FL (ref 8.9–12.7)
POTASSIUM SERPL-SCNC: 4 MMOL/L (ref 3.5–5.3)
RBC # BLD AUTO: 4.3 MILLION/UL (ref 3.88–5.62)
SODIUM SERPL-SCNC: 135 MMOL/L (ref 136–145)
WBC # BLD AUTO: 14.61 THOUSAND/UL (ref 4.31–10.16)

## 2022-01-24 PROCEDURE — 52332 CYSTOSCOPY AND TREATMENT: CPT | Performed by: UROLOGY

## 2022-01-24 PROCEDURE — 96361 HYDRATE IV INFUSION ADD-ON: CPT

## 2022-01-24 PROCEDURE — 99202 OFFICE O/P NEW SF 15 MIN: CPT | Performed by: UROLOGY

## 2022-01-24 PROCEDURE — 87086 URINE CULTURE/COLONY COUNT: CPT | Performed by: UROLOGY

## 2022-01-24 PROCEDURE — G0379 DIRECT REFER HOSPITAL OBSERV: HCPCS

## 2022-01-24 PROCEDURE — 80048 BASIC METABOLIC PNL TOTAL CA: CPT | Performed by: UROLOGY

## 2022-01-24 PROCEDURE — 74420 UROGRAPHY RTRGR +-KUB: CPT

## 2022-01-24 PROCEDURE — 99219 PR INITIAL OBSERVATION CARE/DAY 50 MINUTES: CPT | Performed by: INTERNAL MEDICINE

## 2022-01-24 PROCEDURE — C2617 STENT, NON-COR, TEM W/O DEL: HCPCS | Performed by: UROLOGY

## 2022-01-24 PROCEDURE — C1769 GUIDE WIRE: HCPCS | Performed by: UROLOGY

## 2022-01-24 PROCEDURE — 85025 COMPLETE CBC W/AUTO DIFF WBC: CPT | Performed by: STUDENT IN AN ORGANIZED HEALTH CARE EDUCATION/TRAINING PROGRAM

## 2022-01-24 DEVICE — INLAY OPTIMA URETERAL STENT W/O GUIDEWIRE
Type: IMPLANTABLE DEVICE | Status: FUNCTIONAL
Brand: BARD® INLAY OPTIMA® URETERAL STENT

## 2022-01-24 RX ORDER — ONDANSETRON 2 MG/ML
4 INJECTION INTRAMUSCULAR; INTRAVENOUS ONCE AS NEEDED
Status: DISCONTINUED | OUTPATIENT
Start: 2022-01-24 | End: 2022-01-24 | Stop reason: HOSPADM

## 2022-01-24 RX ORDER — ONDANSETRON 2 MG/ML
4 INJECTION INTRAMUSCULAR; INTRAVENOUS EVERY 6 HOURS PRN
Status: CANCELLED | OUTPATIENT
Start: 2022-01-24

## 2022-01-24 RX ORDER — SODIUM CHLORIDE, SODIUM LACTATE, POTASSIUM CHLORIDE, CALCIUM CHLORIDE 600; 310; 30; 20 MG/100ML; MG/100ML; MG/100ML; MG/100ML
INJECTION, SOLUTION INTRAVENOUS CONTINUOUS PRN
Status: DISCONTINUED | OUTPATIENT
Start: 2022-01-24 | End: 2022-01-24

## 2022-01-24 RX ORDER — OXYCODONE HYDROCHLORIDE 5 MG/1
5 TABLET ORAL EVERY 4 HOURS PRN
Status: DISCONTINUED | OUTPATIENT
Start: 2022-01-24 | End: 2022-01-25 | Stop reason: HOSPADM

## 2022-01-24 RX ORDER — ACETAMINOPHEN 325 MG/1
650 TABLET ORAL EVERY 6 HOURS PRN
Status: CANCELLED | OUTPATIENT
Start: 2022-01-24

## 2022-01-24 RX ORDER — MIDAZOLAM HYDROCHLORIDE 2 MG/2ML
INJECTION, SOLUTION INTRAMUSCULAR; INTRAVENOUS AS NEEDED
Status: DISCONTINUED | OUTPATIENT
Start: 2022-01-24 | End: 2022-01-24

## 2022-01-24 RX ORDER — SODIUM CHLORIDE, SODIUM LACTATE, POTASSIUM CHLORIDE, CALCIUM CHLORIDE 600; 310; 30; 20 MG/100ML; MG/100ML; MG/100ML; MG/100ML
125 INJECTION, SOLUTION INTRAVENOUS CONTINUOUS
Status: DISCONTINUED | OUTPATIENT
Start: 2022-01-24 | End: 2022-01-25 | Stop reason: HOSPADM

## 2022-01-24 RX ORDER — OXYCODONE HYDROCHLORIDE 5 MG/1
10 TABLET ORAL EVERY 4 HOURS PRN
Status: DISCONTINUED | OUTPATIENT
Start: 2022-01-24 | End: 2022-01-25 | Stop reason: HOSPADM

## 2022-01-24 RX ORDER — NICOTINE 21 MG/24HR
1 PATCH, TRANSDERMAL 24 HOURS TRANSDERMAL DAILY
Status: DISCONTINUED | OUTPATIENT
Start: 2022-01-24 | End: 2022-01-25 | Stop reason: HOSPADM

## 2022-01-24 RX ORDER — PROPOFOL 10 MG/ML
INJECTION, EMULSION INTRAVENOUS AS NEEDED
Status: DISCONTINUED | OUTPATIENT
Start: 2022-01-24 | End: 2022-01-24

## 2022-01-24 RX ORDER — ACETAMINOPHEN 325 MG/1
650 TABLET ORAL EVERY 6 HOURS PRN
Status: DISCONTINUED | OUTPATIENT
Start: 2022-01-24 | End: 2022-01-25 | Stop reason: HOSPADM

## 2022-01-24 RX ORDER — HYDROMORPHONE HCL/PF 1 MG/ML
0.2 SYRINGE (ML) INJECTION EVERY 6 HOURS PRN
Status: DISCONTINUED | OUTPATIENT
Start: 2022-01-24 | End: 2022-01-25 | Stop reason: HOSPADM

## 2022-01-24 RX ORDER — FENTANYL CITRATE 50 UG/ML
INJECTION, SOLUTION INTRAMUSCULAR; INTRAVENOUS AS NEEDED
Status: DISCONTINUED | OUTPATIENT
Start: 2022-01-24 | End: 2022-01-24

## 2022-01-24 RX ORDER — SODIUM CHLORIDE 9 MG/ML
150 INJECTION, SOLUTION INTRAVENOUS CONTINUOUS
Status: CANCELLED | OUTPATIENT
Start: 2022-01-24 | End: 2022-01-25

## 2022-01-24 RX ORDER — TAMSULOSIN HYDROCHLORIDE 0.4 MG/1
0.4 CAPSULE ORAL 2 TIMES DAILY
Status: CANCELLED | OUTPATIENT
Start: 2022-01-24

## 2022-01-24 RX ORDER — HYDROMORPHONE HCL/PF 1 MG/ML
0.25 SYRINGE (ML) INJECTION
Status: DISCONTINUED | OUTPATIENT
Start: 2022-01-24 | End: 2022-01-24 | Stop reason: HOSPADM

## 2022-01-24 RX ORDER — LIDOCAINE HYDROCHLORIDE 10 MG/ML
INJECTION, SOLUTION EPIDURAL; INFILTRATION; INTRACAUDAL; PERINEURAL AS NEEDED
Status: DISCONTINUED | OUTPATIENT
Start: 2022-01-24 | End: 2022-01-24

## 2022-01-24 RX ORDER — TAMSULOSIN HYDROCHLORIDE 0.4 MG/1
0.4 CAPSULE ORAL
Status: DISCONTINUED | OUTPATIENT
Start: 2022-01-24 | End: 2022-01-25 | Stop reason: HOSPADM

## 2022-01-24 RX ORDER — FENTANYL CITRATE/PF 50 MCG/ML
50 SYRINGE (ML) INJECTION
Status: DISCONTINUED | OUTPATIENT
Start: 2022-01-24 | End: 2022-01-24 | Stop reason: HOSPADM

## 2022-01-24 RX ORDER — OXYCODONE HYDROCHLORIDE 5 MG/1
5 TABLET ORAL EVERY 4 HOURS PRN
Status: CANCELLED | OUTPATIENT
Start: 2022-01-24

## 2022-01-24 RX ORDER — CEFAZOLIN SODIUM 1 G/3ML
INJECTION, POWDER, FOR SOLUTION INTRAMUSCULAR; INTRAVENOUS AS NEEDED
Status: DISCONTINUED | OUTPATIENT
Start: 2022-01-24 | End: 2022-01-24

## 2022-01-24 RX ORDER — DEXAMETHASONE SODIUM PHOSPHATE 10 MG/ML
INJECTION, SOLUTION INTRAMUSCULAR; INTRAVENOUS AS NEEDED
Status: DISCONTINUED | OUTPATIENT
Start: 2022-01-24 | End: 2022-01-24

## 2022-01-24 RX ORDER — CEFAZOLIN SODIUM 2 G/50ML
2000 SOLUTION INTRAVENOUS
Status: DISCONTINUED | OUTPATIENT
Start: 2022-01-24 | End: 2022-01-25

## 2022-01-24 RX ADMIN — MIDAZOLAM 2 MG: 1 INJECTION INTRAMUSCULAR; INTRAVENOUS at 07:25

## 2022-01-24 RX ADMIN — SODIUM CHLORIDE, SODIUM LACTATE, POTASSIUM CHLORIDE, AND CALCIUM CHLORIDE 125 ML/HR: .6; .31; .03; .02 INJECTION, SOLUTION INTRAVENOUS at 09:37

## 2022-01-24 RX ADMIN — PROPOFOL 200 MG: 10 INJECTION, EMULSION INTRAVENOUS at 07:31

## 2022-01-24 RX ADMIN — Medication 50 MCG: at 08:41

## 2022-01-24 RX ADMIN — OXYCODONE HYDROCHLORIDE 5 MG: 5 TABLET ORAL at 14:37

## 2022-01-24 RX ADMIN — CEFAZOLIN 2000 MG: 1 INJECTION, POWDER, FOR SOLUTION INTRAMUSCULAR; INTRAVENOUS at 07:35

## 2022-01-24 RX ADMIN — OXYCODONE HYDROCHLORIDE 5 MG: 5 TABLET ORAL at 22:39

## 2022-01-24 RX ADMIN — FENTANYL CITRATE 50 MCG: 50 INJECTION INTRAMUSCULAR; INTRAVENOUS at 07:40

## 2022-01-24 RX ADMIN — CEFTRIAXONE 1000 MG: 1 INJECTION, POWDER, FOR SOLUTION INTRAMUSCULAR; INTRAVENOUS at 17:00

## 2022-01-24 RX ADMIN — SODIUM CHLORIDE, SODIUM LACTATE, POTASSIUM CHLORIDE, AND CALCIUM CHLORIDE: .6; .31; .03; .02 INJECTION, SOLUTION INTRAVENOUS at 06:58

## 2022-01-24 RX ADMIN — FENTANYL CITRATE 50 MCG: 50 INJECTION INTRAMUSCULAR; INTRAVENOUS at 07:33

## 2022-01-24 RX ADMIN — DEXAMETHASONE SODIUM PHOSPHATE 10 MG: 10 INJECTION, SOLUTION INTRAMUSCULAR; INTRAVENOUS at 07:35

## 2022-01-24 RX ADMIN — HYDROMORPHONE HYDROCHLORIDE 0.2 MG: 1 INJECTION, SOLUTION INTRAMUSCULAR; INTRAVENOUS; SUBCUTANEOUS at 18:03

## 2022-01-24 RX ADMIN — TAMSULOSIN HYDROCHLORIDE 0.4 MG: 0.4 CAPSULE ORAL at 16:59

## 2022-01-24 RX ADMIN — LIDOCAINE HYDROCHLORIDE 50 MG: 10 INJECTION, SOLUTION EPIDURAL; INFILTRATION; INTRACAUDAL; PERINEURAL at 07:31

## 2022-01-24 RX ADMIN — Medication 50 MCG: at 08:31

## 2022-01-24 NOTE — PLAN OF CARE
Problem: PAIN - ADULT  Goal: Verbalizes/displays adequate comfort level or baseline comfort level  Description: Interventions:  - Encourage patient to monitor pain and request assistance  - Assess pain using appropriate pain scale  - Administer analgesics based on type and severity of pain and evaluate response  - Implement non-pharmacological measures as appropriate and evaluate response  - Consider cultural and social influences on pain and pain management  - Notify physician/advanced practitioner if interventions unsuccessful or patient reports new pain  Outcome: Progressing     Problem: INFECTION - ADULT  Goal: Absence or prevention of progression during hospitalization  Description: INTERVENTIONS:  - Assess and monitor for signs and symptoms of infection  - Monitor lab/diagnostic results  - Monitor all insertion sites, i e  indwelling lines, tubes, and drains  - [place] appropriate cooling/warming therapies per order  - Administer medications as ordered  - Instruct and encourage patient and family to use good hand hygiene technique  - Identify and instruct in appropriate isolation precautions for identified infection/condition  Outcome: Progressing  Goal: Absence of fever/infection during neutropenic period  Description: INTERVENTIONS:  - Monitor WBC    Outcome: Progressing     Problem: DISCHARGE PLANNING  Goal: Discharge to home or other facility with appropriate resources  Description: INTERVENTIONS:  - Identify barriers to discharge w/patient and caregiver  - Arrange for needed discharge resources and transportation as appropriate  - Identify discharge learning needs (meds, wound care, etc )  - Arrange for interpretive services to assist at discharge as needed  - Refer to Case Management Department for coordinating discharge planning if the patient needs post-hospital services based on physician/advanced practitioner order or complex needs related to functional status, cognitive ability, or social support system  Outcome: Progressing     Problem: Knowledge Deficit  Goal: Patient/family/caregiver demonstrates understanding of disease process, treatment plan, medications, and discharge instructions  Description: Complete learning assessment and assess knowledge base    Interventions:  - Provide teaching at level of understanding  - Provide teaching via preferred learning methods  Outcome: Progressing     Problem: GENITOURINARY - ADULT  Goal: Maintains or returns to baseline urinary function  Description: INTERVENTIONS:  - Assess urinary function  - Encourage oral fluids to ensure adequate hydration if ordered  - Administer IV fluids as ordered to ensure adequate hydration  - Administer ordered medications as needed  - Offer frequent toileting  - Follow urinary retention protocol if ordered  Outcome: Progressing  Goal: Absence of urinary retention  Description: INTERVENTIONS:  - Assess patient's ability to void and empty bladder  - Monitor I/O  - Bladder scan as needed  - Discuss with physician/AP medications to alleviate retention as needed  - Discuss catheterization for long term situations as appropriate  Outcome: Progressing

## 2022-01-24 NOTE — ASSESSMENT & PLAN NOTE
UA negative for wbc, N, LE  Intra-op urine noted to be thick turbid consistent with UTI; culture sent  Notably, pt was recently on Abx (1/5/22) with 7day course keflex which may contribute to some false negative results  WBC elevated at 22 59  Hemodynamically stable, afebrile    S/p CTX x 3 doses    Intra-op urine cultures pending

## 2022-01-24 NOTE — TELEPHONE ENCOUNTER
New patient, right proximal ureteral calculus with UTI  Treated with ureteral stent today  Please schedule the patient for follow-up ureteroscopy next available MD     Will not require office visit will require preoperative urine culture      Patient lives in [address] [facility] for stent tracking

## 2022-01-24 NOTE — TELEPHONE ENCOUNTER
Patient still currently admitted, emailing provider for review of case and conf of date  Will contact once d/c

## 2022-01-24 NOTE — ED NOTES
Callbell within reach  Bed in low position  Siderails up  HOB elevated  Patient resting comfortably at this time        Eveline Pereira, [address] [facility]  01/23/22 1948

## 2022-01-24 NOTE — OP NOTE
Operative Note     PATIENT:  Courtney Nunes (MRN [id number])    DATE OF PROCEDURE:   1/24/2022    PRE-OP DIAGNOSIS:   1) right proximal ureteral calculus  2) urinary tract infection with significant leukocytosis    POST-OP DIAGNOSIS:   1) right proximal ureteral calculus  2) urinary tract infection with significant leukocytosis    PROCEDURES PERFORMED:  1) Cystoscopy  2) Right retrograde pyelography with fluoroscopic interpretation  3) Right ureteral stent placement    SURGEON:  Tanya Batista MD    ASSISTANTS:  There were no qualified teaching residents to assist with this case    ANESTHESIA: General     COMPLICATIONS:   None    ANTIBIOTICS:  Ancef, also receiving scheduled Rocephin    INTRAOPERATIVE THROMBOEMBOLISM PROPHYLAXIS:  Pneumatic compression stockings      FINDINGS:  Successful right ureteral stent placement drainage of thick turbid urine consistent with upper urinary tract infection  Urine was not purulent however  As discussed preoperatively the patient will require secondary intervention to extract the stone via ureteroscopy once he has completed antibiotics and recovered from this acute infectious event  INDICATIONS FOR PROCEDURE:  Courtney Nunes is an 19 y o  old male with 5 mm right proximal ureteral calculus, urinary tract infection and leukocytosis to 22,000  Stella Kezia After discussing the options, the patient elected to undergo ureteral stent placement  We discussed the procedure in detail, the alternatives, and the risks, and they signed informed consent to proceed  PROCEDURE IN DETAIL:   The patient was identified and brought to the OR  Antibiotic prophylaxis and DVT prophylaxis were administered  They were placed in the comfortable dorsal lithotomy position with care to pad all pressure points  They were prepped and draped in the usual sterile fashion using hibiclens    A surgical time out was performed with all in the room in agreement with the correct patient, procedure, indications, and laterality  A 21-[place] rigid cystoscope was used to enter the bladder  The bladder was inspected in its entirety and there were no lesions noted  The ureteral orifices were identified in their normal orthotopic positions  The Right ureteral orifice was identified and a 5 Fr open ended catheter was placed into the ureteral orifice  A retrograde pyelogram was performed with injection of 50/50 Isovue with the findings as described above  A Sensor wire was up to the kidney under fluoroscopic guidance  The open-ended catheter is navigated atop the pre-placed wire and advanced to the renal pelvis  A sterile urine culture was collected and submitted for analysis   the distance between the UVJ and the UPJ was measured and appropriately sized stent was selected  The JJ stent was then passed up the wire  under fluoroscopic guidance into the  kidney with a good curl noted in the kidney and in the bladder  The bladder was drained  The patient was placed back supine, awakened from general anesthesia and brought to recovery room in stable condition  SPECIMENS:   Order Name Source Comment Collection Info Order Time   URINE CULTURE Urine, Other  Collected By: Amina Perez MD 1/24/2022  7:44 AM     Release to patient through [employer]   Immediate             IMPLANTS:   Implant Name Type Inv  Item Serial No   Lot No  LRB No  Used Action   STENT URETERAL 6 FR 26CM INLAY OPTIMA - [id number]  STENT URETERAL 6 FR 26CM [address] [id number] Right [id number] Implanted        COMPLICATIONS: None    DISPOSITION: PACU    PLAN:   - patient will return to the medical dobbs for monitoring  If he remains hemodynamically stable and afebrile he may be eligible for discharge home later today versus tomorrow  Patient will require a course of antibiotics      - he will require secondary intervention to extract the stone via ureteroscopy and I requested planning for this as an outpatient with our office

## 2022-01-24 NOTE — H&P
INTERNAL MEDICINE RESIDENCY ADMISSION H&P     Name: Devika Argueta   Age & Sex: 19 y o  male   MRN: [id number]  Unit/Bed#: OR POOL   Encounter: [id number]  Primary Care Provider: No primary care provider on file  Code Status: Level 1 - Full Code  Admission Status: OBSERVATION  Disposition: Patient requires Med/Surg    Admit to team: SOD Team A    ASSESSMENT/PLAN     Principal Problem:    Hydronephrosis with urinary obstruction due to renal calculus  Active Problems:    Acute kidney injury ([address] )    UTI (urinary tract infection)      UTI (urinary tract infection)  Assessment & Plan  UA negative for wbc, N, LE  Intra-op urine noted to be thick turbid consistent with UTI; culture sent  WBC elevated at 22 59  Hemodynamically stable, afebrile  Pt was recently on Abx (1/5/22) with 7day course keflex which may contribute to some false negative results  · Continue IV ceftriaxone for 2 more doses  · Follow-up intra-op urine culture    Acute kidney injury [facility])  Assessment & Plan  NARENDRA secondary to postobstructive nephrolithiasis with moderate hydronephrosis  Creatinine 2 03 on presentation with baseline creatinine of 1 0  Continue IVF, rocephin  Follow up BMP    * Hydronephrosis with urinary obstruction due to renal calculus  Assessment & Plan  Pt with hx of renal stones (recent presentation [facility] 1/5/22)   Miners- 1/23/22 presenting with R Flank pain, found to have 4mm R sided proximal nephrolithiasis with moderate hydronephrosis  Labs also notable for presence of elevated WBC 22 59 however UA without WBC/ N/LE  BMP with evidence of NARENDRA with Cr  2 03  POD#0 s/p right ureteral stent  Will continue Rocephin  Urology consult  flomax  IVF  Pain regimen  Follow up daily CBC, BMP  Monitor hemodynamics  Renal stone prevention education       VTE Pharmacologic Prophylaxis: No need for DVT ppx  VTE Mechanical Prophylaxis: sequential compression device    CHIEF COMPLAINT   No chief complaint on file  HISTORY OF PRESENT ILLNESS     Pt is a 19yo M with sig PMH of renal stones (recent ED presentation 1/5/22) who presented [facility] 1/23/22 with R Flank pain x1 day  Patient also reported hematuria  Pt found to have 4mm R sided proximal nephrolithiasis with moderate hydronephrosis  Labs also notable for presence of elevated WBC 22 59 however UA without WBC/ N/LE  BMP with evidence of NARENDRA with Cr  2 03  Patient was started on IVF, ceftriaxone, Flomax and pain regimen with improvement in symptoms  Patient was transferred to [facility] directly to OR on 1/24/22 for right ureteral stent placement with Urology  Patient evaluated postop who reported improvement in flank pain  Vital signs stable and patient remains afebrile  No other acute complaints  Patient admitted to medicine for observation  Of note, was given 7 day course of keflex 500mg Q6 (1/5/22 at prior ED visit) however UA at that time only showed 4-10 WBC, negative LE, N       Level 1 full code  REVIEW OF SYSTEMS     Review of Systems   Constitutional: Negative for chills and fever  HENT: Negative for congestion and sinus pain  Eyes: Negative for pain and visual disturbance  Respiratory: Negative for cough and shortness of breath  Cardiovascular: Negative for chest pain and leg swelling  Gastrointestinal: Negative for abdominal pain, constipation, diarrhea, nausea and vomiting  Genitourinary: Positive for flank pain and hematuria  Musculoskeletal: Positive for back pain  Negative for arthralgias  Skin: Negative for rash and wound  Neurological: Negative for dizziness, light-headedness and headaches  Psychiatric/Behavioral: Negative for behavioral problems and suicidal ideas  All other systems reviewed and are negative      OBJECTIVE     Vitals:    01/24/22 0755 01/24/22 0800 01/24/22 0815 01/24/22 0830   BP: (!) 91/37 104/50 122/56 121/71   Pulse: 82 82 88 80   Resp: 18 16 (!) 24 (!) 25   Temp: 99 1 °F (37 3 °C)  99 °F (37 2 °C) SpO2: 99% 99% 97% 98%      Temperature:   Temp (24hrs), Av 6 °F (37 °C), Min:97 7 °F (36 5 °C), Max:99 1 °F (37 3 °C)    Temperature: 99 °F (37 2 °C)  Intake & Output:  I/O     None        Weights: There is no height or weight on file to calculate BMI  Weight (last 2 days)     None        Physical Exam  Vitals and nursing note reviewed  Constitutional:       General: He is not in acute distress  Appearance: Normal appearance  He is normal weight  He is not ill-appearing  HENT:      Head: Normocephalic and atraumatic  Eyes:      Conjunctiva/sclera: Conjunctivae normal    Cardiovascular:      Rate and Rhythm: Normal rate and regular rhythm  Pulses: Normal pulses  Heart sounds: Normal heart sounds  Pulmonary:      Effort: Pulmonary effort is normal  No respiratory distress  Breath sounds: Normal breath sounds  No wheezing or rales  Abdominal:      General: Bowel sounds are normal       Palpations: Abdomen is soft  Tenderness: There is abdominal tenderness (mild right)  Musculoskeletal:      Right lower leg: No edema  Left lower leg: No edema  Skin:     General: Skin is warm and dry  Neurological:      Mental Status: He is alert and oriented to person, place, and time     Psychiatric:         Mood and Affect: Mood normal          Behavior: Behavior normal        PAST MEDICAL HISTORY     Past Medical History:   Diagnosis Date    Kidney stones      PAST SURGICAL HISTORY     Past Surgical History:   Procedure Laterality Date    KIDNEY STONE SURGERY       SOCIAL & FAMILY HISTORY     Social History     Substance and Sexual Activity   Alcohol Use Yes     Substance and Sexual Activity   Alcohol Use Yes        Substance and Sexual Activity   Drug Use Not Currently    Types: Marijuana     Social History     Tobacco Use   Smoking Status Current Every Day Smoker    Packs/day: 0 50    Types: Cigarettes   Smokeless Tobacco Never Used     No family history on file   LABORATORY DATA     Labs: I have personally reviewed pertinent reports  Results from last 7 days   Lab Units 01/23/22  1646   WBC Thousand/uL 22 59*   HEMOGLOBIN g/dL 13 1   HEMATOCRIT % 38 5   PLATELETS Thousands/uL 315   NEUTROS PCT % 88*   MONOS PCT % 6      Results from last 7 days   Lab Units 01/23/22  1843 01/23/22  1646   POTASSIUM mmol/L 3 8 3 8   CHLORIDE mmol/L 100 97*   CO2 mmol/L 24 26   BUN mg/dL 24 26*   CREATININE mg/dL 1 71* 2 03*   CALCIUM mg/dL 8 4 9 2                  Results from last 7 days   Lab Units 01/23/22  1843   LACTIC ACID mmol/L 1 4         Micro:  No results found for: Sera Cao, WOUNDCULT, SPUTUMCULTUR  IMAGING & DIAGNOSTIC TESTS     Imaging: I have personally reviewed pertinent reports  CT renal stone study abdomen pelvis wo contrast    Result Date: 1/23/2022  Impression: 1  Interval migration of the previously present 4 mm proximal right ureteral calculus into the mid ureter with new associated moderate right-sided hydroureteronephrosis  Urological assessment is advised  2   Resolved left-sided hydronephrosis with interval extraction/passing of the previously present left renal collecting system calculi  Workstation performed: [id number]     EKG, Pathology, and Other Studies: I have personally reviewed pertinent reports  ALLERGIES   No Known Allergies  MEDICATIONS PRIOR TO ARRIVAL     Prior to Admission medications    Medication Sig Start Date End Date Taking?  Authorizing Provider   oxyCODONE-acetaminophen (Percocet) 5-325 mg per tablet Take 1 tablet by mouth every 4 (four) hours as needed for moderate pain Max Daily Amount: 6 tablets 1/5/22   Leann Michael PA-C   tamsulosin [facility]) 0 4 mg Take 1 capsule (0 4 mg total) by mouth daily with dinner 1/5/22   Leann Michael PA-C     MEDICATIONS ADMINISTERED IN LAST 24 HOURS     Medication Administration - last 24 hours from 01/23/2022 0901 to 01/24/2022 0901       Date/Time Order Dose Route Action Action by     01/24/2022 0811 lactated ringers infusion 125 mL/hr Intravenous Continued from [address], RN     01/24/2022 0841 fentaNYL (SUBLIMAZE) injection 50 mcg 50 mcg Intravenous Given Sharon Dhaliwal RN     01/24/2022 0831 fentaNYL (SUBLIMAZE) injection 50 mcg 50 mcg Intravenous Given Sharon Dhaliwal, LUKE        CURRENT MEDICATIONS     Current Facility-Administered Medications   Medication Dose Route Frequency Provider Last Rate    cefazolin  2,000 mg Intravenous On Call To OR Elena Leon MD      cefTRIAXone  1,000 mg Intravenous Q24H Deepika Steiner,       fentaNYL  50 mcg Intravenous Q3 min PRN Allan Chock, CRNA      HYDROmorphone  0 2 mg Intravenous Q6H PRN Ersunny Gaona, DO      HYDROmorphone  0 25 mg Intravenous Q5 Min PRN Allan Chock, CRNA      lactated ringers  125 mL/hr Intravenous Continuous Allan Chock, CRNA 125 mL/hr (01/24/22 0487)    nicotine  1 patch Transdermal Daily Deepika Steiner,       ondansetron  4 mg Intravenous Once PRN Allan Chock, CRNA      oxyCODONE  10 mg Oral Q4H PRN Erleen Jimenez, DO      oxyCODONE  5 mg Oral Q4H PRN Erleen Jimenez, DO      tamsulosin  0 4 mg Oral Daily With [employer], DO       lactated ringers, 125 mL/hr, Last Rate: 125 mL/hr (01/24/22 6633)          Admission Time  I spent 45 minutes admitting the patient  This involved direct patient contact where I performed a full history and physical, reviewing previous records, and reviewing laboratory and other diagnostic studies  Portions of the record may have been created with voice recognition software  Occasional wrong word or "sound a like" substitutions may have occurred due to the inherent limitations of voice recognition software    Read the chart carefully and recognize, using context, where substitutions have occurred     ==  Olesya Gaona DO - PGY-2  Internal Medicine Residency  [address] [place] [address]

## 2022-01-24 NOTE — ASSESSMENT & PLAN NOTE
· Hx of bilateral renal stones (recent presentation to [facility] 1/5/22)  · SL Miners- 1/23/22 presenting with R Flank pain, found to have 4mm R sided proximal nephrolithiasis with moderate hydronephrosis  Elevated WBC 22 59, UA without WBC/N/LE  Evidence of NARENDRA with Cr  2 03    · POD#1 s/p right ureteral stent  Plan:  · Patient scheduled follow-up with Urology in 2 weeks  · Renal stone prevention education  · Patient has a history of recurrent calcium phosphate stone, he may benefit from thiazide diuretics  However will defer this to PCP for initiation and management  · Discharge meds:  · Naproxen 500 mg BID 14 days, with pantoprazole 20 mg   · Phenazopyridine 100 mg TID  · Tamsulosin 0 4 mg for 7 days

## 2022-01-24 NOTE — ASSESSMENT & PLAN NOTE
NARENDRA secondary to postobstructive nephrolithiasis with moderate hydronephrosis  Creatinine 2 03 on presentation with baseline creatinine of 1 0      Continue IVF, rocephin  Follow up BMP

## 2022-01-24 NOTE — CONSULTS
UROLOGY CONSULTATION NOTE     Patient Identifiers: Hawa Ramírez (MRN [id number])  Service Requesting Consultation:  Emergency department  Service Providing Consultation:  Urology, Quentin Jaramillo MD    Date of Service: 1/24/2022    Reason for Consultation:  Internal Medicine      ASSESSMENT:     1  Right ureteral calculus with obstruction and infection    Discussed staged intervention as the standard of care for an obstructing infected ureteral calculus  We will begin with ureteral stent placement today for decompression source control the patient will require follow-up for ureteroscopy      Discussed options for management of the patient's ureteral stone  We discussed surgical options including ureteroscopy and shock wave lithotripsy  In addition we discussed conservative management with medical expulsive therapy  The patient has elected to undergo ureteroscopy  I discussed with the patients risks and benefits and alternatives to ureteroscopy with laser lithotripsy  The risks include bleeding, infection, injury to the urethra, bladder, ureter or kidney, risk of a staged procedure, risk of stricture, risk of residual fragments, risk of loss of kidney, risks of anesthesia including DVT, PE, MI and death  The patient understands that a ureteral stent will likely be left in place at the time of the procedure  We reviewed the expected postoperative care  PLAN:     To OR for right ureteral stent placement    Thank you for allowing me to participate in this patients care  Please do not hesitate to call with any additional questions    Quentin Jaramillo MD    History of Present Illness:     Hawa Ramírez is a 19 y o  old with a history of recurrent nephrolithiasis    Past Medical, Past Surgical History:     Past Medical History:   Diagnosis Date    Kidney stones    :    Past Surgical History:   Procedure Laterality Date    KIDNEY STONE SURGERY     :    Medications, Allergies:   No current facility-administered medications for this encounter  Allergies:  No Known Allergies:    Social and Family History:   Social History:   Social History     Tobacco Use    Smoking status: Current Every Day Smoker     Packs/day: 0 50     Types: Cigarettes    Smokeless tobacco: Never Used   Substance Use Topics    Alcohol use: Yes    Drug use: Not Currently     Types: Marijuana     Social History     Tobacco Use   Smoking Status Current Every Day Smoker    Packs/day: 0 50    Types: Cigarettes   Smokeless Tobacco Never Used       Family History:  No family history on file :     Review of Systems:     General: Fever, chills, or night sweats: negative  Cardiac: Negative for chest pain  Pulmonary: Negative for shortness of breath  Gastrointestinal: Abdominal pain positive  Nausea, vomiting, or diarrhea positive,  Genitourinary: See HPI above  Patient does not have hematuria  All other systems queried were negative  Physical Exam:   General: Patient is pleasant and in NAD  Awake and alert  There were no vitals taken for this visit  Cardiac: Peripheral edema: negative  Pulmonary: Non-labored breathing  Abdomen: Soft, non-tender, non-distended  No surgical scars  No masses, tenderness, hernias noted  Genitourinary: Negative CVA tenderness, positive suprapubic tenderness        Labs:     Lab Results   Component Value Date    HGB 13 1 01/23/2022    HCT 38 5 01/23/2022    WBC 22 59 (H) 01/23/2022     01/23/2022   ]    Lab Results   Component Value Date    K 3 8 01/23/2022     01/23/2022    CO2 24 01/23/2022    BUN 24 01/23/2022    CREATININE 1 71 (H) 01/23/2022    CALCIUM 8 4 01/23/2022   ]    Imaging:   I personally reviewed the images and report of the following studies, and reviewed them with the patient:

## 2022-01-24 NOTE — ANESTHESIA PREPROCEDURE EVALUATION
Procedure:  CYSTOSCOPY RETROGRADE PYELOGRAM WITH INSERTION STENT URETERAL (Right Bladder)    Relevant Problems   ANESTHESIA (within normal limits)      CARDIO (within normal limits)      ENDO (within normal limits)      GI/HEPATIC (within normal limits)      /RENAL   (+) Acute kidney injury (HCC)   (+) Hydronephrosis with urinary obstruction due to renal calculus      HEMATOLOGY (within normal limits)      MUSCULOSKELETAL (within normal limits)      NEURO/PSYCH (within normal limits)      PULMONARY (within normal limits)        Physical Exam    Airway    Mallampati score: I  TM Distance: >3 FB  Neck ROM: full     Dental   No notable dental hx     Cardiovascular  Rhythm: regular, Rate: normal, Cardiovascular exam normal    Pulmonary  Pulmonary exam normal     Other Findings  Brackets on some teeth      Anesthesia Plan  ASA Score- 2     Anesthesia Type- general with ASA Monitors  Additional Monitors:   Airway Plan: LMA  Plan Factors-Exercise tolerance (METS): >4 METS  Chart reviewed  EKG reviewed  Existing labs reviewed  Patient summary reviewed  Patient is a current smoker  Patient not instructed to abstain from smoking on day of procedure  Patient did not smoke on day of surgery  Induction- intravenous  Postoperative Plan- Plan for postoperative opioid use  Informed Consent- Anesthetic plan and risks discussed with patient

## 2022-01-24 NOTE — ANESTHESIA POSTPROCEDURE EVALUATION
Post-Op Assessment Note    CV Status:  Stable  Pain Score: 0    Pain management: adequate     Mental Status:  Sleepy   Hydration Status:  Stable and euvolemic   PONV Controlled:  None   Airway Patency:  Patent and adequate      Post Op Vitals Reviewed: Yes      Staff: CRNA         No complications documented      BP   91/37   Temp 99 1F   Pulse 81   Resp 18   SpO2 99%

## 2022-01-25 VITALS
HEIGHT: 65 IN | OXYGEN SATURATION: 98 % | RESPIRATION RATE: 16 BRPM | WEIGHT: 179.9 LBS | SYSTOLIC BLOOD PRESSURE: 128 MMHG | HEART RATE: 60 BPM | BODY MASS INDEX: 29.97 KG/M2 | DIASTOLIC BLOOD PRESSURE: 59 MMHG | TEMPERATURE: 97.9 F

## 2022-01-25 PROBLEM — N17.9 ACUTE KIDNEY INJURY (HCC): Status: RESOLVED | Noted: 2022-01-23 | Resolved: 2022-01-25

## 2022-01-25 PROBLEM — N39.0 UTI (URINARY TRACT INFECTION): Status: RESOLVED | Noted: 2022-01-23 | Resolved: 2022-01-25

## 2022-01-25 LAB
ANION GAP SERPL CALCULATED.3IONS-SCNC: 6 MMOL/L (ref 4–13)
BACTERIA UR CULT: NORMAL
BASOPHILS # BLD AUTO: 0.01 THOUSANDS/ΜL (ref 0–0.1)
BASOPHILS NFR BLD AUTO: 0 % (ref 0–1)
BUN SERPL-MCNC: 22 MG/DL (ref 5–25)
CALCIUM SERPL-MCNC: 9.1 MG/DL (ref 8.3–10.1)
CHLORIDE SERPL-SCNC: 104 MMOL/L (ref 100–108)
CO2 SERPL-SCNC: 25 MMOL/L (ref 21–32)
CREAT SERPL-MCNC: 1.25 MG/DL (ref 0.6–1.3)
EOSINOPHIL # BLD AUTO: 0 THOUSAND/ΜL (ref 0–0.61)
EOSINOPHIL NFR BLD AUTO: 0 % (ref 0–6)
ERYTHROCYTE [DISTWIDTH] IN BLOOD BY AUTOMATED COUNT: 12.9 % (ref 11.6–15.1)
GFR SERPL CREATININE-BSD FRML MDRD: 80 ML/MIN/1.73SQ M
GLUCOSE P FAST SERPL-MCNC: 88 MG/DL (ref 65–99)
GLUCOSE SERPL-MCNC: 88 MG/DL (ref 65–140)
HCT VFR BLD AUTO: 36.5 % (ref 36.5–49.3)
HGB BLD-MCNC: 12.1 G/DL (ref 12–17)
IMM GRANULOCYTES # BLD AUTO: 0.06 THOUSAND/UL (ref 0–0.2)
IMM GRANULOCYTES NFR BLD AUTO: 0 % (ref 0–2)
LYMPHOCYTES # BLD AUTO: 2.67 THOUSANDS/ΜL (ref 0.6–4.47)
LYMPHOCYTES NFR BLD AUTO: 18 % (ref 14–44)
MCH RBC QN AUTO: 30 PG (ref 26.8–34.3)
MCHC RBC AUTO-ENTMCNC: 33.2 G/DL (ref 31.4–37.4)
MCV RBC AUTO: 91 FL (ref 82–98)
MONOCYTES # BLD AUTO: 1.29 THOUSAND/ΜL (ref 0.17–1.22)
MONOCYTES NFR BLD AUTO: 9 % (ref 4–12)
NEUTROPHILS # BLD AUTO: 10.46 THOUSANDS/ΜL (ref 1.85–7.62)
NEUTS SEG NFR BLD AUTO: 73 % (ref 43–75)
NRBC BLD AUTO-RTO: 0 /100 WBCS
PLATELET # BLD AUTO: 280 THOUSANDS/UL (ref 149–390)
PMV BLD AUTO: 9.5 FL (ref 8.9–12.7)
POTASSIUM SERPL-SCNC: 3.6 MMOL/L (ref 3.5–5.3)
RBC # BLD AUTO: 4.03 MILLION/UL (ref 3.88–5.62)
SODIUM SERPL-SCNC: 135 MMOL/L (ref 136–145)
WBC # BLD AUTO: 14.49 THOUSAND/UL (ref 4.31–10.16)

## 2022-01-25 PROCEDURE — 85025 COMPLETE CBC W/AUTO DIFF WBC: CPT | Performed by: STUDENT IN AN ORGANIZED HEALTH CARE EDUCATION/TRAINING PROGRAM

## 2022-01-25 PROCEDURE — 80048 BASIC METABOLIC PNL TOTAL CA: CPT | Performed by: STUDENT IN AN ORGANIZED HEALTH CARE EDUCATION/TRAINING PROGRAM

## 2022-01-25 PROCEDURE — 99225 PR SBSQ OBSERVATION CARE/DAY 25 MINUTES: CPT | Performed by: PHYSICIAN ASSISTANT

## 2022-01-25 RX ORDER — PHENAZOPYRIDINE HYDROCHLORIDE 100 MG/1
100 TABLET, FILM COATED ORAL
Qty: 10 TABLET | Refills: 0 | Status: ON HOLD | OUTPATIENT
Start: 2022-01-25 | End: 2022-02-14 | Stop reason: SDUPTHER

## 2022-01-25 RX ORDER — NAPROXEN 500 MG/1
500 TABLET ORAL 2 TIMES DAILY WITH MEALS
Qty: 28 TABLET | Refills: 0 | Status: SHIPPED | OUTPATIENT
Start: 2022-01-25 | End: 2022-02-14

## 2022-01-25 RX ORDER — PANTOPRAZOLE SODIUM 20 MG/1
20 TABLET, DELAYED RELEASE ORAL DAILY
Qty: 14 TABLET | Refills: 0 | Status: SHIPPED | OUTPATIENT
Start: 2022-01-25 | End: 2022-02-14

## 2022-01-25 RX ORDER — LIDOCAINE 50 MG/G
1 PATCH TOPICAL DAILY
Status: DISCONTINUED | OUTPATIENT
Start: 2022-01-25 | End: 2022-01-25 | Stop reason: HOSPADM

## 2022-01-25 RX ORDER — TAMSULOSIN HYDROCHLORIDE 0.4 MG/1
0.4 CAPSULE ORAL
Qty: 7 CAPSULE | Refills: 0 | Status: ON HOLD | OUTPATIENT
Start: 2022-01-25 | End: 2022-02-14 | Stop reason: SDUPTHER

## 2022-01-25 RX ORDER — PHENAZOPYRIDINE HYDROCHLORIDE 100 MG/1
100 TABLET, FILM COATED ORAL
Status: DISCONTINUED | OUTPATIENT
Start: 2022-01-25 | End: 2022-01-25 | Stop reason: HOSPADM

## 2022-01-25 RX ADMIN — PHENAZOPYRIDINE 100 MG: 100 TABLET ORAL at 11:18

## 2022-01-25 RX ADMIN — OXYCODONE HYDROCHLORIDE 10 MG: 5 TABLET ORAL at 04:44

## 2022-01-25 RX ADMIN — DEXTROSE 1000 MG: 50 INJECTION, SOLUTION INTRAVENOUS at 11:17

## 2022-01-25 RX ADMIN — PHENAZOPYRIDINE 100 MG: 100 TABLET ORAL at 08:27

## 2022-01-25 RX ADMIN — NICOTINE 1 PATCH: 14 PATCH, EXTENDED RELEASE TRANSDERMAL at 08:27

## 2022-01-25 RX ADMIN — SODIUM CHLORIDE, SODIUM LACTATE, POTASSIUM CHLORIDE, AND CALCIUM CHLORIDE 125 ML/HR: .6; .31; .03; .02 INJECTION, SOLUTION INTRAVENOUS at 01:00

## 2022-01-25 RX ADMIN — LIDOCAINE 5% 1 PATCH: 700 PATCH TOPICAL at 08:27

## 2022-01-25 NOTE — UTILIZATION REVIEW
Initial Clinical Review    Admission: Date/Time/Statement:   Admission Orders (From admission, onward)     Ordered        01/24/22 0842  Place in Observation  Once                      Orders Placed This Encounter   Procedures    Place in Observation     Standing Status:   Standing     Number of Occurrences:   1     Order Specific Question:   Level of Care     Answer:   Med Surg [16]     Initial Presentation: 19year old male, presented to the ED @ [facility], transferred to [address] [place] [address] level of care, via EMS  Admitted as Observation due to Hydronephrosis with urinary obstruction due to renal calculus  PMH:  Kidney stones with surgery  Date: 01/24/2022   He states that he had a history of renal calculi few weeks ago, symptoms went away with some Flomax and pain medicine, he states yesterday the pain returned it is right-sided high in the flank associated with hematuria as well as nausea and vomiting  01/24/2022  Consult Uro: To OR for right ureteral stent placement  DATE OF PROCEDURE:   1/24/2022   PROCEDURES PERFORMED:  1) Cystoscopy    2) Right retrograde pyelography with fluoroscopic interpretation  3) Right ureteral stent placement  ANESTHESIA: General   FINDINGS:  Successful right ureteral stent placement drainage of thick turbid urine consistent with upper urinary tract infection  Urine was not purulent however  As discussed preoperatively the patient will require secondary intervention to extract the stone via ureteroscopy once he has completed antibiotics and recovered from this acute infectious event  Day 2: 01/25/2022 POD #1 s/p cystoscopy with right ureteral stent placement  No further  intervention required this admission  Cr improving  Continue IV flds  Continue IV Abx  Patient require 2nd definitive stone treatment which will be arranged on an outpatient basis      VTE Pharmacologic Prophylaxis: No need for DVT ppx  VTE Mechanical Prophylaxis: sequential compression device     Triage Vitals   Temperature Pulse Respirations Blood Pressure SpO2   01/24/22 0755 01/24/22 0755 01/24/22 0755 01/24/22 0755 01/24/22 0755   99 1 °F (37 3 °C) 82 18 (!) 91/37 99 %      Temp Source Heart Rate Source Patient Position - Orthostatic VS BP Location FiO2 (%)   01/24/22 2243 01/24/22 2243 -- 01/24/22 2243 --   Oral Monitor  Right arm       Pain Score       01/24/22 0616       2          Wt Readings from Last 1 Encounters:   01/24/22 81 6 kg (179 lb 14 3 oz)     Additional Vital Signs:   Date/Time Temp Pulse Resp BP MAP (mmHg) SpO2 O2 Flow Rate (L/min) O2 Device Cardiac (WDL)   01/25/22 1000 -- -- -- -- -- -- -- None (Room air) --   01/25/22 07:24:38 97 9 °F (36 6 °C) -- -- 128/59 82 -- -- -- --   01/24/22 2243 98 2 °F (36 8 °C) 60 16 116/66 83 98 % -- None (Room air) --   01/24/22 15:34:31 -- 81 -- 129/80 96 98 % -- -- --   01/24/22 15:32:42 98 5 °F (36 9 °C) -- -- 129/80 96 -- -- -- --   01/24/22 1439 98 5 °F (36 9 °C) 80 16 124/66 -- 98 % -- -- --   01/24/22 1400 -- -- -- -- -- -- -- None (Room air) --   01/24/22 1345 98 5 °F (36 9 °C) 84 16 128/64 -- 98 % -- -- --   01/24/22 1200 98 4 °F (36 9 °C) 86 16 132/70 108 98 % -- -- --   01/24/22 1100 -- 82 22 127/71 89 98 % -- None (Room air) WDL   01/24/22 1030 -- 72 20 122/64 87 98 % -- -- --   01/24/22 1000 -- 70 19 135/71 91 98 % -- -- --   01/24/22 0930 -- 88 16 144/86 105 99 % -- -- --   01/24/22 0900 -- 72 18 130/65 92 98 % -- -- --   01/24/22 0845 -- 72 16 118/70 88 97 % -- -- --   01/24/22 0830 -- 80 25 Abnormal  121/71 92 98 % -- -- --   01/24/22 0815 99 °F (37 2 °C) 88 24 Abnormal  122/56 88 97 % -- None (Room air) WDL   01/24/22 0800 -- 82 16 104/50 54 99 % -- -- --   01/24/22 0755 99 1 °F (37 3 °C) 82 18 91/37 Abnormal  54 99 % 6 L/min Simple mask WDL     Date and Time Eye Opening Best Verbal Response Best Motor Response [place] Coma Scale Score   01/25/22 1000 4 5 6 15   01/24/22 2000 4 5 6 15   01/24/22 1400 4 5 6 15   01/24/22 1100 4 5 6 15   01/24/22 0815 4 5 6 15   01/24/22 0755 4 5 6 15     01/23/2022 @ 1743  CT renal stone:  Abd/pel:  1   Interval migration of the previously present 4 mm proximal right ureteral calculus into the mid ureter with new associated moderate right-sided hydroureteronephrosis   Urological assessment is advised  2   Resolved left-sided hydronephrosis with interval extraction/passing of the previously present left renal collecting system calculi       Pertinent Labs/Diagnostic Test Results:     Results from last 7 days   Lab Units 01/25/22  0452 01/24/22  0922 01/23/22  1646   WBC Thousand/uL 14 49* 14 61* 22 59*   HEMOGLOBIN g/dL 12 1 12 9 13 1   HEMATOCRIT % 36 5 39 2 38 5   PLATELETS Thousands/uL 280 292 315   NEUTROS ABS Thousands/µL 10 46* 12 69* 19 91*     Results from last 7 days   Lab Units 01/25/22  0452 01/24/22  0922 01/23/22  1843 01/23/22  1646   SODIUM mmol/L 135* 135* 134* 132*   POTASSIUM mmol/L 3 6 4 0 3 8 3 8   CHLORIDE mmol/L 104 105 100 97*   CO2 mmol/L 25 26 24 26   ANION GAP mmol/L 6 4 10 9   BUN mg/dL 22 27* 24 26*   CREATININE mg/dL 1 25 1 80* 1 71* 2 03*   EGFR ml/min/1 73sq m 80 51 55 44   CALCIUM mg/dL 9 1 9 0 8 4 9 2     Results from last 7 days   Lab Units 01/25/22  0452 01/24/22  0922 01/23/22  1843 01/23/22  1646   GLUCOSE RANDOM mg/dL 88 94 104 112     Results from last 7 days   Lab Units 01/23/22  1843   LACTIC ACID mmol/L 1 4     Results from last 7 days   Lab Units 01/23/22  1949   CLARITY UA  Clear   COLOR UA  Yellow   SPEC GRAV UA  1 015   PH UA  6 0   GLUCOSE UA mg/dl Negative   KETONES UA mg/dl Negative   BLOOD UA  Large*   PROTEIN UA mg/dl Negative   NITRITE UA  Negative   BILIRUBIN UA  Negative   UROBILINOGEN UA E U /dl 0 2   LEUKOCYTES UA  Negative   WBC UA /hpf None Seen   RBC UA /hpf Innumerable*   BACTERIA UA /hpf Occasional   EPITHELIAL CELLS WET PREP /hpf None Seen   MUCUS THREADS  Occasional*     Results from last 7 days   Lab Units 01/24/22  0744   URINE CULTURE  No Growth <1000 cfu/mL     Past Medical History:   Diagnosis Date    Kidney stones      Present on Admission:   Hydronephrosis with urinary obstruction due to renal calculus   Acute kidney injury (HCC)   UTI (urinary tract infection)      Admitting Diagnosis: Acute kidney injury (NARENDRA) with acute tubular necrosis (ATN) (HCC) [N17 0]  Age/Sex: 19 y o  male  Admission Orders:  dialy weight I&O  Evelio SDCs  Strain all urine    Scheduled Medications:  cefTRIAXone, 1,000 mg, Intravenous, Once  lidocaine, 1 patch, Topical, Daily  nicotine, 1 patch, Transdermal, Daily  phenazopyridine, 100 mg, Oral, TID With Meals  tamsulosin, 0 4 mg, Oral, Daily With Dinner      Continuous IV Infusions:  lactated ringers, 125 mL/hr, Intravenous, Continuous      PRN Meds:  acetaminophen, 650 mg, Oral, Q6H PRN  HYDROmorphone, 0 2 mg, Intravenous, Q6H PRN  X 1 dose 1/24  oxyCODONE, 10 mg, Oral, Q4H PRN  X 1 dose 1/25  oxyCODONE, 5 mg, Oral, Q4H PRN  X 2 doses 1/24            Network Utilization Review Department  ATTENTION: Please call with any questions or concerns to [phone] and carefully listen to the prompts so that you are directed to the right person  All voicemails are confidential   Gabi Goldsmith all requests for admission clinical reviews, approved or denied determinations and any other requests to dedicated fax number below belonging to the campus where the patient is receiving treatment   List of dedicated fax numbers for the Facilities:  [address] [place] [address] DENIALS (Administrative/Medical Necessity) [phone]   [address] [place] [address] (Maternity/NICU/Pediatrics) [address] [facility] [facility] [address] [place] [address] [facility] [place] [address] [address] [facility] [phone]

## 2022-01-25 NOTE — PLAN OF CARE
Problem: PAIN - ADULT  Goal: Verbalizes/displays adequate comfort level or baseline comfort level  Description: Interventions:  - Encourage patient to monitor pain and request assistance  - Assess pain using appropriate pain scale  - Administer analgesics based on type and severity of pain and evaluate response  - Implement non-pharmacological measures as appropriate and evaluate response  - Consider cultural and social influences on pain and pain management  - Notify physician/advanced practitioner if interventions unsuccessful or patient reports new pain  Outcome: Progressing     Problem: INFECTION - ADULT  Goal: Absence or prevention of progression during hospitalization  Description: INTERVENTIONS:  - Assess and monitor for signs and symptoms of infection  - Monitor lab/diagnostic results  - Monitor all insertion sites, i e  indwelling lines, tubes, and drains  - [place] appropriate cooling/warming therapies per order  - Administer medications as ordered  - Instruct and encourage patient and family to use good hand hygiene technique  - Identify and instruct in appropriate isolation precautions for identified infection/condition  Outcome: Progressing  Goal: Absence of fever/infection during neutropenic period  Description: INTERVENTIONS:  - Monitor WBC    Outcome: Progressing     Problem: DISCHARGE PLANNING  Goal: Discharge to home or other facility with appropriate resources  Description: INTERVENTIONS:  - Identify barriers to discharge w/patient and caregiver  - Arrange for needed discharge resources and transportation as appropriate  - Identify discharge learning needs (meds, wound care, etc )  - Arrange for interpretive services to assist at discharge as needed  - Refer to Case Management Department for coordinating discharge planning if the patient needs post-hospital services based on physician/advanced practitioner order or complex needs related to functional status, cognitive ability, or social support system  Outcome: Progressing     Problem: Knowledge Deficit  Goal: Patient/family/caregiver demonstrates understanding of disease process, treatment plan, medications, and discharge instructions  Description: Complete learning assessment and assess knowledge base    Interventions:  - Provide teaching at level of understanding  - Provide teaching via preferred learning methods  Outcome: Progressing     Problem: GENITOURINARY - ADULT  Goal: Maintains or returns to baseline urinary function  Description: INTERVENTIONS:  - Assess urinary function  - Encourage oral fluids to ensure adequate hydration if ordered  - Administer IV fluids as ordered to ensure adequate hydration  - Administer ordered medications as needed  - Offer frequent toileting  - Follow urinary retention protocol if ordered  Outcome: Progressing  Goal: Absence of urinary retention  Description: INTERVENTIONS:  - Assess patient's ability to void and empty bladder  - Monitor I/O  - Bladder scan as needed  - Discuss with physician/AP medications to alleviate retention as needed  - Discuss catheterization for long term situations as appropriate  Outcome: Progressing

## 2022-01-25 NOTE — PROGRESS NOTES
Progress Note - Urology  [facility] 19 y o  male MRN: [id number]  Unit/Bed#: -01 Encounter: [id number]    Assessment & Plan:    Nephrolithiasis:  -CT scan revealed interval migration of previously presented 4 mm proximal right ureteral calculus into mid ureter with associated moderate right-sided hydronephrosis  -patient is postop day 1 right ureteral stent placement with Dr Tricia Dc, progressing well  -creatinine improving from 2 03 on admission-1 25 today  -patient with leukocytosis of 14 improving from 22 on admission  -vital signs stable, patient afebrile  -continue with medical optimization at this time with IV fluids and antibiotics  -patient require 2nd definitive stone treatment which will be arranged on an outpatient basis  No further  intervention required this admission  Urology will sign off  Patient is clear from urologic standpoint for discharge  Will defer to Medicine team for discharge  Our office will contact patient upon discharge to schedule 2nd definitive stone treatment  Subjective/Objective   Chief Complaint:  None    Subjective:   Patient currently lying comfortably in bed in no acute distress  He reports that he is doing well postoperatively  He reports that he has had this procedure before in the past and has had stents  Therefore he does have some stent discomfort but overall reports that he is doing well and pain is minimal   He reports he does have some gross hematuria but is improving  And does have some mild tenderness on the right side  Objective:     Blood pressure 128/59, pulse 60, temperature 97 9 °F (36 6 °C), resp  rate 16, height 5' 5" (1 651 m), weight 81 6 kg (179 lb 14 3 oz), SpO2 98 %  ,Body mass index is 29 94 kg/m²        Intake/Output Summary (Last 24 hours) at 1/25/2022 1154  Last data filed at 1/25/2022 0100  Gross per 24 hour   Intake 1100 ml   Output 150 ml   Net 950 ml       Invasive Devices  Report    Peripheral Intravenous Line Peripheral IV 01/25/22 Right Antecubital <1 day          Drain            Ureteral Drain/Stent Right ureter 6 Fr  1 day                Physical Exam  Constitutional:       General: He is not in acute distress  Appearance: He is normal weight  He is not ill-appearing, toxic-appearing or diaphoretic  HENT:      Head: Normocephalic and atraumatic  Right Ear: External ear normal       Left Ear: External ear normal       Nose: Nose normal       Mouth/Throat:      Pharynx: Oropharynx is clear  Eyes:      General: No scleral icterus  Conjunctiva/sclera: Conjunctivae normal    Cardiovascular:      Rate and Rhythm: Normal rate and regular rhythm  Pulses: Normal pulses  Heart sounds: Normal heart sounds  No murmur heard  No friction rub  No gallop  Pulmonary:      Effort: Pulmonary effort is normal  No respiratory distress  Breath sounds: No wheezing, rhonchi or rales  Abdominal:      General: Bowel sounds are normal  There is no distension  Tenderness: There is abdominal tenderness  There is no right CVA tenderness or left CVA tenderness  Comments: Mild tenderness noted on right upper and lower quadrants  No CVA tenderness   Musculoskeletal:         General: Normal range of motion  Cervical back: Normal range of motion  Skin:     General: Skin is warm and dry  Neurological:      General: No focal deficit present  Mental Status: He is alert and oriented to person, place, and time  Psychiatric:         Mood and Affect: Mood normal          Behavior: Behavior normal          Thought Content: Thought content normal          Judgment: Judgment normal          Lab, Imaging and other studies:I have personally reviewed pertinent lab results    Lab Results   Component Value Date    WBC 14 49 (H) 01/25/2022    HGB 12 1 01/25/2022    HCT 36 5 01/25/2022    MCV 91 01/25/2022     01/25/2022     Lab Results   Component Value Date    SODIUM 135 (L) 01/25/2022    K 3 6 01/25/2022     01/25/2022    CO2 25 01/25/2022    BUN 22 01/25/2022    CREATININE 1 25 01/25/2022    GLUC 88 01/25/2022    CALCIUM 9 1 01/25/2022         VTE Pharmacologic Prophylaxis: Reason for no pharmacologic prophylaxis Low risk  VTE Mechanical Prophylaxis: sequential compression device      Ananda Oates PA-C

## 2022-01-25 NOTE — PLAN OF CARE
Problem: INFECTION - ADULT  Goal: Absence or prevention of progression during hospitalization  Description: INTERVENTIONS:  - Assess and monitor for signs and symptoms of infection  - Monitor lab/diagnostic results  - Monitor all insertion sites, i e  indwelling lines, tubes, and drains  - [place] appropriate cooling/warming therapies per order  - Administer medications as ordered  - Instruct and encourage patient and family to use good hand hygiene technique  - Identify and instruct in appropriate isolation precautions for identified infection/condition  Outcome: Progressing

## 2022-01-25 NOTE — DISCHARGE INSTRUCTIONS
Follow up with Urology   Kidney stone education and prevention as outlined below  Establish care with PCP        Kidney Stones   WHAT YOU NEED TO KNOW:   Kidney stones form in the urinary system when the water and waste in your urine are out of balance  When this happens, certain types of waste crystals separate from the urine  The crystals build up and form kidney stones  You may have more than one kidney stone  DISCHARGE INSTRUCTIONS:   Seek care immediately if:   · You are vomiting and it is not relieved with medicine  Call your doctor or kidney specialist if:   · You have a fever  · You have trouble urinating  · You see blood in your urine  · You have severe pain  · You have any questions or concerns about your condition or care  Medicines: You may need any of the following:  · NSAIDs , such as ibuprofen, help decrease swelling, pain, and fever  This medicine is available with or without a doctor's order  NSAIDs can cause stomach bleeding or kidney problems in certain people  If you take blood thinner medicine, always ask your healthcare provider if NSAIDs are safe for you  Always read the medicine label and follow directions  · Acetaminophen  decreases pain and fever  It is available without a doctor's order  Ask how much to take and how often to take it  Follow directions  Read the labels of all other medicines you are using to see if they also contain acetaminophen, or ask your doctor or pharmacist  Acetaminophen can cause liver damage if not taken correctly  Do not use more than 4 grams (4,000 milligrams) total of acetaminophen in one day  · Prescription pain medicine  may be given  Ask your healthcare provider how to take this medicine safely  Some prescription pain medicines contain acetaminophen  Do not take other medicines that contain acetaminophen without talking to your healthcare provider  Too much acetaminophen may cause liver damage   Prescription pain medicine may cause constipation  Ask your healthcare provider how to prevent or treat constipation  · Medicines  to balance your electrolytes may be needed  · Take your medicine as directed  Contact your healthcare provider if you think your medicine is not helping or if you have side effects  Tell him or her if you are allergic to any medicine  Keep a list of the medicines, vitamins, and herbs you take  Include the amounts, and when and why you take them  Bring the list or the pill bottles to follow-up visits  Carry your medicine list with you in case of an emergency  What you can do to manage kidney stones:   · Drink more liquids  Your healthcare provider may tell you to drink at least 8 to 12 (eight-ounce) cups of liquids each day  This helps flush out the kidney stones when you urinate  Water is the best liquid to drink  · Strain your urine every time you go to the bathroom  Urinate through a strainer or a piece of thin cloth to catch the stones  Take the stones to your healthcare provider so they can be sent to the lab for tests  This will help your healthcare providers plan the best treatment for you  · Eat a variety of healthy foods  Healthy foods include fruits, vegetables, whole-grain breads, low-fat dairy products, beans, and fish  You may need to limit how much sodium (salt) or protein you eat  Ask for information about the best foods for you  · Be physically active as directed  Your stones may pass more easily if you stay active  Physical activity can also help you manage your weight  Ask about the best activities for you  After you pass the kidney stones: Your healthcare provider may  order a 24-hour urine test  Results from a 24-hour urine test will help your healthcare provider plan ways to prevent more stones from forming  Your healthcare provider will give you more instructions    Follow up with your doctor or kidney specialist as directed:  Write down your questions so you remember to ask them during your visits  © Copyright [employer] 2021 Information is for End User's use only and may not be sold, redistributed or otherwise used for commercial purposes  All illustrations and images included in CareNotes® are the copyrighted property of A [employer] M [employer]  or Stefan Rao  The above information is an  only  It is not intended as medical advice for individual conditions or treatments  Talk to your doctor, nurse or pharmacist before following any medical regimen to see if it is safe and effective for you

## 2022-01-25 NOTE — DISCHARGE SUMMARY
INTERNAL MEDICINE RESIDENCY DISCHARGE SUMMARY     Umer Dubon   19 y o  male  MRN: [id number]  Room/Bed: MS 80/MS [address] [place] [address] MED SURG 7   Encounter: [id number]    Principal Problem:    Hydronephrosis with urinary obstruction due to renal calculus      * Hydronephrosis with urinary obstruction due to renal calculus  Assessment & Plan  · Hx of bilateral renal stones (recent presentation to [facility] 1/5/22)  · [facility]- 1/23/22 presenting with R Flank pain, found to have 4mm R sided proximal nephrolithiasis with moderate hydronephrosis  Elevated WBC 22 59, UA without WBC/N/LE  Evidence of NARENDRA with Cr  2 03    · POD#1 s/p right ureteral stent  Plan:  · Patient scheduled follow-up with Urology in 2 weeks  · Renal stone prevention education  · Patient has a history of recurrent calcium phosphate stone, he may benefit from thiazide diuretics  However will defer this to PCP for initiation and management  · Discharge meds:  · Naproxen 500 mg BID 14 days, with pantoprazole 20 mg   · Phenazopyridine 100 mg TID  · Tamsulosin 0 4 mg for 7 days  UTI (urinary tract infection)-resolved as of 1/25/2022  Assessment & Plan  UA negative for wbc, N, LE  Intra-op urine noted to be thick turbid consistent with UTI; culture sent  Notably, pt was recently on Abx (1/5/22) with 7day course keflex which may contribute to some false negative results  WBC elevated at 22 59  Hemodynamically stable, afebrile  S/p CTX x 3 doses    Intra-op urine cultures pending     Acute kidney injury (HCC)-resolved as of 1/25/2022  Assessment & Plan  NARENDRA secondary to postobstructive nephrolithiasis with moderate hydronephrosis  Creatinine 2 03 on presentation with baseline creatinine of 1 0      Continue IVF, rocephin  Follow up [address] [place] [address]     Patient is a 19 y o male with PMHx of renal stones s/p ureteral stent removal [facility] 8/2020) and recent bilateral ureteral calculi and hydronephrosis ([facility] 1/5/22) who [facility]22 with right flank pain and hematuria that started the day prior  Presenting labs were notable for an elevated white count to 22,000 and creatinine of 2 03  Urine analysis and urine cultures were unremarkable  CT renal stone study revealed a 4 mm proximal right ureteral calculus with moderate right-sided hydroureteronephrosis  Patient was started on IV fluids, ceftriaxone, tamsulosin, and pain regimen with improvement in symptoms  He was transferred the next day 1/24/22 to [address] [place] [address] for right ureteral stent placement with Urology  Patient reported significant improvement in flank pain post-operatively  He was deemed stable for discharge on naproxen 500 mg BID for 14 days with pantoprazole 20 mg, phenazopyridine 100 mg TID, tamsulosin 0 4 mg for 7 days, and renal stone prevention eduction  Consider possible underlying familial predisposition on outpatient follow up as the patient's father also has a history of renal stones  Patient is scheduled to follow-up with urology in 2 weeks    DISCHARGE INFORMATION     PCP at Discharge: None, needs to establish  Info link provided  Admitting Provider: Columba Albrecht MD  Admission Date: 1/24/2022    Discharge Provider: Rc Koch MD  Discharge Date: 01/25/22    Discharge Disposition: Non [facility] Acute Care/Short Term Hosp  Discharge Condition: good  Discharge with Lines: no    Discharge Diet: encourage fluids  Activity Restrictions: none  Test Results Pending at Discharge: None    Discharge Diagnoses:  Principal Problem:    Hydronephrosis with urinary obstruction due to renal calculus  Resolved Problems:    Acute kidney injury ([address] )    UTI (urinary tract infection)      Consulting Providers: Urology  Diagnostic & Therapeutic Procedures Performed:  No results found      Code Status: Level 1 - Full Code  Advance Directive & Living Will: <no information>  Power of :    POLST:      Medications:  Current Discharge Medication List        Current Discharge Medication List      START taking these medications    Details   naproxen (EC NAPROSYN) 500 MG EC tablet Take 1 tablet (500 mg total) by mouth 2 (two) times a day with meals for 14 days  Qty: 28 tablet, Refills: 0    Associated Diagnoses: Acute kidney injury (HCC)      pantoprazole (PROTONIX) 20 mg tablet Take 1 tablet (20 mg total) by mouth daily for 14 days Take on the day that you take the naproxen  Qty: 14 tablet, Refills: 0    Associated Diagnoses: Acute kidney injury ([facility] [address] )      phenazopyridine (PYRIDIUM) 100 mg tablet Take 1 tablet (100 mg total) by mouth 3 (three) times a day with meals  Qty: 10 tablet, Refills: 0    Associated Diagnoses: Acute kidney injury (HCC)      tamsulosin (FLOMAX) 0 4 mg Take 1 capsule (0 4 mg total) by mouth daily with dinner for 7 days  Qty: 7 capsule, Refills: 0    Associated Diagnoses: Acute kidney injury ([facility] [address] )           Current Discharge Medication List          Allergies:  No Known Allergies    FOLLOW-UP     PCP Outpatient Follow-up:  Schedule and establish care with PCP within 2 weeks    Consulting Providers Follow-up:  yes      Physician name: Vamsi Jung MD  Specialty: Urology  Office phone number: [phone]  Follow up within next: 2 weeks      Active Issues Requiring Follow-up: Indwelling right ureteral stent      Discharge Statement:   I spent 30 minutes minutes discharging the patient  This time was spent on the day of discharge  I had direct contact with the patient on the day of discharge  Additional documentation is required if more than 30 minutes were spent on discharge  Portions of the record may have been created with voice recognition software  Occasional wrong word or "sound a like" substitutions may have occurred due to the inherent limitations of voice recognition software    Read the chart carefully and recognize, using context, where substitutions have occurred     ==  Boogie Dave, [facility]  Internal Medicine Resident PGY-1

## 2022-01-28 ENCOUNTER — PREP FOR PROCEDURE (OUTPATIENT)
Dept: UROLOGY | Facility: CLINIC | Age: 22
End: 2022-01-28

## 2022-01-28 DIAGNOSIS — N20.1 CALCULUS OF URETER: Primary | ICD-10-CM

## 2022-01-28 NOTE — TELEPHONE ENCOUNTER
Spoke w patient and he is sched for 2/14 at [facility]  He is aware he needs a  and hospital will contact him day prior w time of arrival  He will go for [address] [place] on 2/7 and I am mailing him a surgical packet w this info  He is aware to contact us w any questions or concerns

## 2022-02-11 ENCOUNTER — ANESTHESIA EVENT (OUTPATIENT)
Dept: PERIOP | Facility: HOSPITAL | Age: 22
End: 2022-02-11
Payer: COMMERCIAL

## 2022-02-11 PROBLEM — K21.9 GASTROESOPHAGEAL REFLUX DISEASE: Status: ACTIVE | Noted: 2022-02-11

## 2022-02-11 NOTE — ANESTHESIA PREPROCEDURE EVALUATION
Procedure:  CYSTOSCOPY URETEROSCOPY WITH LITHOTRIPSY HOLMIUM LASER, RETROGRADE PYELOGRAM AND INSERTION STENT URETERAL (Right Bladder)    Relevant Problems   CARDIO (within normal limits)      ENDO (within normal limits)      GI/HEPATIC   (+) Gastroesophageal reflux disease      /RENAL   (+) Hydronephrosis with urinary obstruction due to renal calculus      HEMATOLOGY (within normal limits)      MUSCULOSKELETAL (within normal limits)      NEURO/PSYCH (within normal limits)      PULMONARY (within normal limits)        Physical Exam    Airway    Mallampati score: II  TM Distance: >3 FB  Neck ROM: full     Dental       Cardiovascular  Cardiovascular exam normal    Pulmonary  Pulmonary exam normal     Other Findings        Anesthesia Plan  ASA Score- 2     Anesthesia Type- general with ASA Monitors  Additional Monitors:   Airway Plan: ETT  Plan Factors-Exercise tolerance (METS): >4 METS  Chart reviewed  Existing labs reviewed  Patient is not a current smoker  Patient not instructed to abstain from smoking on day of procedure  Patient did not smoke on day of surgery  Induction- intravenous  Postoperative Plan- Plan for postoperative opioid use  Informed Consent- Anesthetic plan and risks discussed with patient  I personally reviewed this patient with the CRNA  Discussed and agreed on the Anesthesia Plan with the CRNA  Usama Cardozo           No results found for: HGBA1C    Lab Results   Component Value Date    K 3 6 01/25/2022     01/25/2022    CO2 25 01/25/2022    BUN 22 01/25/2022    CREATININE 1 25 01/25/2022    GLUF 88 01/25/2022    CALCIUM 9 1 01/25/2022    EGFR 80 01/25/2022       Lab Results   Component Value Date    WBC 14 49 (H) 01/25/2022    HGB 12 1 01/25/2022    HCT 36 5 01/25/2022    MCV 91 01/25/2022     01/25/2022 Phosphorus uncontrolled. Continue with sensipar 30 mg daily and hectorol 2 mcg with HD but increase phoslo to 4 tabs with meals. Monitor serum calcium and phosphorus.

## 2022-02-14 ENCOUNTER — APPOINTMENT (OUTPATIENT)
Dept: RADIOLOGY | Facility: HOSPITAL | Age: 22
End: 2022-02-14
Payer: COMMERCIAL

## 2022-02-14 ENCOUNTER — HOSPITAL ENCOUNTER (OUTPATIENT)
Facility: HOSPITAL | Age: 22
Setting detail: OUTPATIENT SURGERY
Discharge: HOME | End: 2022-02-14
Attending: UROLOGY | Admitting: UROLOGY
Payer: COMMERCIAL

## 2022-02-14 ENCOUNTER — ANESTHESIA (OUTPATIENT)
Dept: PERIOP | Facility: HOSPITAL | Age: 22
End: 2022-02-14
Payer: COMMERCIAL

## 2022-02-14 ENCOUNTER — TELEPHONE (OUTPATIENT)
Dept: UROLOGY | Facility: MEDICAL CENTER | Age: 22
End: 2022-02-14

## 2022-02-14 VITALS
SYSTOLIC BLOOD PRESSURE: 142 MMHG | TEMPERATURE: 97.4 F | WEIGHT: 179 LBS | BODY MASS INDEX: 29.82 KG/M2 | DIASTOLIC BLOOD PRESSURE: 68 MMHG | OXYGEN SATURATION: 100 % | RESPIRATION RATE: 18 BRPM | HEART RATE: 77 BPM | HEIGHT: 65 IN

## 2022-02-14 DIAGNOSIS — N17.9 ACUTE KIDNEY INJURY (HCC): ICD-10-CM

## 2022-02-14 DIAGNOSIS — N13.2 HYDRONEPHROSIS WITH URINARY OBSTRUCTION DUE TO RENAL CALCULUS: Primary | ICD-10-CM

## 2022-02-14 PROCEDURE — C1769 GUIDE WIRE: HCPCS | Performed by: UROLOGY

## 2022-02-14 PROCEDURE — 74420 UROGRAPHY RTRGR +-KUB: CPT

## 2022-02-14 PROCEDURE — C1758 CATHETER, URETERAL: HCPCS | Performed by: UROLOGY

## 2022-02-14 PROCEDURE — NC001 PR NO CHARGE: Performed by: UROLOGY

## 2022-02-14 PROCEDURE — 52356 CYSTO/URETERO W/LITHOTRIPSY: CPT | Performed by: UROLOGY

## 2022-02-14 PROCEDURE — C2617 STENT, NON-COR, TEM W/O DEL: HCPCS | Performed by: UROLOGY

## 2022-02-14 DEVICE — VARIABLE LENGTH INJECTION STENT SET
Type: IMPLANTABLE DEVICE | Site: BLADDER | Status: FUNCTIONAL
Brand: CONTOUR VL™ INJECTION STENT SET

## 2022-02-14 RX ORDER — PHENAZOPYRIDINE HYDROCHLORIDE 200 MG/1
200 TABLET, FILM COATED ORAL 3 TIMES DAILY PRN
Qty: 10 TABLET | Refills: 0 | Status: SHIPPED | OUTPATIENT
Start: 2022-02-14

## 2022-02-14 RX ORDER — CEFAZOLIN SODIUM 2 G/50ML
SOLUTION INTRAVENOUS AS NEEDED
Status: DISCONTINUED | OUTPATIENT
Start: 2022-02-14 | End: 2022-02-14

## 2022-02-14 RX ORDER — TAMSULOSIN HYDROCHLORIDE 0.4 MG/1
0.4 CAPSULE ORAL
Qty: 10 CAPSULE | Refills: 0 | Status: SHIPPED | OUTPATIENT
Start: 2022-02-14 | End: 2022-02-24

## 2022-02-14 RX ORDER — CEFAZOLIN SODIUM 2 G/50ML
2000 SOLUTION INTRAVENOUS ONCE
Status: DISCONTINUED | OUTPATIENT
Start: 2022-02-14 | End: 2022-02-14 | Stop reason: HOSPADM

## 2022-02-14 RX ORDER — PHENAZOPYRIDINE HYDROCHLORIDE 100 MG/1
200 TABLET, FILM COATED ORAL ONCE AS NEEDED
Status: COMPLETED | OUTPATIENT
Start: 2022-02-14 | End: 2022-02-14

## 2022-02-14 RX ORDER — OXYCODONE HYDROCHLORIDE 5 MG/1
5 TABLET ORAL EVERY 4 HOURS PRN
Status: DISCONTINUED | OUTPATIENT
Start: 2022-02-14 | End: 2022-02-14 | Stop reason: HOSPADM

## 2022-02-14 RX ORDER — PROPOFOL 10 MG/ML
INJECTION, EMULSION INTRAVENOUS AS NEEDED
Status: DISCONTINUED | OUTPATIENT
Start: 2022-02-14 | End: 2022-02-14

## 2022-02-14 RX ORDER — MIDAZOLAM HYDROCHLORIDE 2 MG/2ML
INJECTION, SOLUTION INTRAMUSCULAR; INTRAVENOUS AS NEEDED
Status: DISCONTINUED | OUTPATIENT
Start: 2022-02-14 | End: 2022-02-14

## 2022-02-14 RX ORDER — DEXAMETHASONE SODIUM PHOSPHATE 4 MG/ML
INJECTION, SOLUTION INTRA-ARTICULAR; INTRALESIONAL; INTRAMUSCULAR; INTRAVENOUS; SOFT TISSUE AS NEEDED
Status: DISCONTINUED | OUTPATIENT
Start: 2022-02-14 | End: 2022-02-14

## 2022-02-14 RX ORDER — ONDANSETRON 2 MG/ML
4 INJECTION INTRAMUSCULAR; INTRAVENOUS EVERY 6 HOURS PRN
Status: DISCONTINUED | OUTPATIENT
Start: 2022-02-14 | End: 2022-02-14 | Stop reason: HOSPADM

## 2022-02-14 RX ORDER — KETOROLAC TROMETHAMINE 10 MG/1
10 TABLET, FILM COATED ORAL EVERY 6 HOURS PRN
Qty: 20 TABLET | Refills: 0 | Status: SHIPPED | OUTPATIENT
Start: 2022-02-14 | End: 2022-02-19

## 2022-02-14 RX ORDER — MAGNESIUM HYDROXIDE 1200 MG/15ML
LIQUID ORAL AS NEEDED
Status: DISCONTINUED | OUTPATIENT
Start: 2022-02-14 | End: 2022-02-14 | Stop reason: HOSPADM

## 2022-02-14 RX ORDER — SODIUM CHLORIDE, SODIUM LACTATE, POTASSIUM CHLORIDE, CALCIUM CHLORIDE 600; 310; 30; 20 MG/100ML; MG/100ML; MG/100ML; MG/100ML
100 INJECTION, SOLUTION INTRAVENOUS CONTINUOUS
Status: DISCONTINUED | OUTPATIENT
Start: 2022-02-14 | End: 2022-02-14 | Stop reason: HOSPADM

## 2022-02-14 RX ORDER — SODIUM CHLORIDE, SODIUM LACTATE, POTASSIUM CHLORIDE, CALCIUM CHLORIDE 600; 310; 30; 20 MG/100ML; MG/100ML; MG/100ML; MG/100ML
INJECTION, SOLUTION INTRAVENOUS CONTINUOUS PRN
Status: DISCONTINUED | OUTPATIENT
Start: 2022-02-14 | End: 2022-02-14

## 2022-02-14 RX ORDER — KETOROLAC TROMETHAMINE 30 MG/ML
INJECTION, SOLUTION INTRAMUSCULAR; INTRAVENOUS AS NEEDED
Status: DISCONTINUED | OUTPATIENT
Start: 2022-02-14 | End: 2022-02-14

## 2022-02-14 RX ORDER — FENTANYL CITRATE/PF 50 MCG/ML
50 SYRINGE (ML) INJECTION
Status: DISCONTINUED | OUTPATIENT
Start: 2022-02-14 | End: 2022-02-14 | Stop reason: HOSPADM

## 2022-02-14 RX ORDER — SODIUM CHLORIDE, SODIUM LACTATE, POTASSIUM CHLORIDE, CALCIUM CHLORIDE 600; 310; 30; 20 MG/100ML; MG/100ML; MG/100ML; MG/100ML
50 INJECTION, SOLUTION INTRAVENOUS CONTINUOUS
Status: DISCONTINUED | OUTPATIENT
Start: 2022-02-14 | End: 2022-02-14 | Stop reason: HOSPADM

## 2022-02-14 RX ORDER — ACETAMINOPHEN 325 MG/1
650 TABLET ORAL EVERY 6 HOURS PRN
Status: DISCONTINUED | OUTPATIENT
Start: 2022-02-14 | End: 2022-02-14 | Stop reason: HOSPADM

## 2022-02-14 RX ORDER — ONDANSETRON 2 MG/ML
4 INJECTION INTRAMUSCULAR; INTRAVENOUS ONCE AS NEEDED
Status: DISCONTINUED | OUTPATIENT
Start: 2022-02-14 | End: 2022-02-14 | Stop reason: HOSPADM

## 2022-02-14 RX ORDER — LIDOCAINE HYDROCHLORIDE 10 MG/ML
INJECTION, SOLUTION EPIDURAL; INFILTRATION; INTRACAUDAL; PERINEURAL AS NEEDED
Status: DISCONTINUED | OUTPATIENT
Start: 2022-02-14 | End: 2022-02-14

## 2022-02-14 RX ORDER — ONDANSETRON 2 MG/ML
INJECTION INTRAMUSCULAR; INTRAVENOUS AS NEEDED
Status: DISCONTINUED | OUTPATIENT
Start: 2022-02-14 | End: 2022-02-14

## 2022-02-14 RX ORDER — FENTANYL CITRATE 50 UG/ML
INJECTION, SOLUTION INTRAMUSCULAR; INTRAVENOUS AS NEEDED
Status: DISCONTINUED | OUTPATIENT
Start: 2022-02-14 | End: 2022-02-14

## 2022-02-14 RX ORDER — KETOROLAC TROMETHAMINE 30 MG/ML
15 INJECTION, SOLUTION INTRAMUSCULAR; INTRAVENOUS EVERY 6 HOURS SCHEDULED
Status: DISCONTINUED | OUTPATIENT
Start: 2022-02-14 | End: 2022-02-14 | Stop reason: HOSPADM

## 2022-02-14 RX ADMIN — DEXAMETHASONE SODIUM PHOSPHATE 4 MG: 4 INJECTION, SOLUTION INTRAMUSCULAR; INTRAVENOUS at 14:30

## 2022-02-14 RX ADMIN — KETOROLAC TROMETHAMINE 30 MG: 30 INJECTION, SOLUTION INTRAMUSCULAR at 15:21

## 2022-02-14 RX ADMIN — ONDANSETRON 4 MG: 2 INJECTION INTRAMUSCULAR; INTRAVENOUS at 14:30

## 2022-02-14 RX ADMIN — FENTANYL CITRATE 25 MCG: 50 INJECTION, SOLUTION INTRAMUSCULAR; INTRAVENOUS at 14:43

## 2022-02-14 RX ADMIN — FENTANYL CITRATE 25 MCG: 50 INJECTION, SOLUTION INTRAMUSCULAR; INTRAVENOUS at 14:51

## 2022-02-14 RX ADMIN — PROPOFOL 200 MG: 10 INJECTION, EMULSION INTRAVENOUS at 14:26

## 2022-02-14 RX ADMIN — PHENAZOPYRIDINE HYDROCHLORIDE 200 MG: 100 TABLET ORAL at 16:13

## 2022-02-14 RX ADMIN — FENTANYL CITRATE 50 MCG: 50 INJECTION, SOLUTION INTRAMUSCULAR; INTRAVENOUS at 14:26

## 2022-02-14 RX ADMIN — SODIUM CHLORIDE, SODIUM LACTATE, POTASSIUM CHLORIDE, AND CALCIUM CHLORIDE: .6; .31; .03; .02 INJECTION, SOLUTION INTRAVENOUS at 14:15

## 2022-02-14 RX ADMIN — MIDAZOLAM 2 MG: 1 INJECTION INTRAMUSCULAR; INTRAVENOUS at 14:26

## 2022-02-14 RX ADMIN — LIDOCAINE HYDROCHLORIDE 50 MG: 10 INJECTION, SOLUTION EPIDURAL; INFILTRATION; INTRACAUDAL; PERINEURAL at 14:26

## 2022-02-14 RX ADMIN — CEFAZOLIN SODIUM 2000 MG: 2 SOLUTION INTRAVENOUS at 14:20

## 2022-02-14 NOTE — DISCHARGE INSTRUCTIONS
Ureteroscopy   WHAT YOU NEED TO KNOW:   A ureteroscopy is a procedure to examine in the inside of your urinary tract, which includes your urethra, bladder, ureters, and kidneys  A ureteroscope is a small, thin tube with a light and camera on the end  Ureteroscopy can help your healthcare provider diagnose and treat problems in your urinary tract, such as kidney stones  DISCHARGE INSTRUCTIONS:   Medicine:   · Antibiotics  may be given to treat or prevent an infection  · Take your medicine as directed  Contact your healthcare provider if you think your medicine is not helping or if you have side effects  Tell him or her if you are allergic to any medicine  Keep a list of the medicines, vitamins, and herbs you take  Include the amounts, and when and why you take them  Bring the list or the pill bottles to follow-up visits  Carry your medicine list with you in case of an emergency  Follow up with your healthcare provider as directed:  Write down your questions so you remember to ask them during your visits  Drink liquids as directed  Liquids can help prevent kidney stones and urinary tract infections  Drink water and limit the amount of caffeine you drink  Caffeine may be found in coffee, tea, soda, sports drinks, and foods  Ask your healthcare provider how much liquid to drink each day  Contact your healthcare provider if:   · You have a fever  · You cannot urinate  · It is normal to have some blood in urine  · You are vomiting  · You have pain in your abdomen or side  · You have questions or concerns about your condition or care  © Copyright [employer] 2018 Information is for End User's use only and may not be sold, redistributed or otherwise used for commercial purposes  All illustrations and images included in CareNotes® are the copyrighted property of [employer]  or Stefan Rao  The above information is an  only   It is not intended as medical advice for individual conditions or treatments  Talk to your doctor, nurse or pharmacist before following any medical regimen to see if it is safe and effective for you

## 2022-02-14 NOTE — OP NOTE
PERATIVE REPORT  PATIENT NAME: Jaison Jama    :  2000  MRN: [id number]  Pt Location:  OR ROOM 10    SURGERY DATE: 2022    Surgeon(s) and Role:     * Josias Sellers MD - Primary    Preop Diagnosis:  Calculus of ureter [N20 1]    Post-Op Diagnosis Codes:     * Calculus of ureter [N20 1]    Procedure(s) (LRB):  CYSTOSCOPY URETEROSCOPY WITH LITHOTRIPSY HOLMIUM LASER, RETROGRADE PYELOGRAM AND INSERTION STENT URETERAL (Right)    Specimen(s):  * No specimens in log *    Estimated Blood Loss:   Minimal    Drains:  Ureteral Drain/Stent Right ureter 6 Fr  (Active)   Number of days: 21       Anesthesia Type:   General/LMA    Operative Indications:  Calculus of ureter [N20 1]      Operative Findings:  Normal urethra, mild prostatic enlargement, normal bladder and orthotopic orifices  The right ureteral stent placed previously was in good position  The stent was externalized and removed  A guidewire was placed  Rigid ureteroscopy to the upper ureter did not reveal any stone  Flexible ureteroscopy identified the stone to now be in the renal pelvis  The stone was pushed into a mid pole calyx and dusted  A 6 Turkish contour stent was placed  Dangler was left long so the stent can be removed by the nursing staff  Complications:   None    Procedure and Technique:  PLAN FOR STENT:  Removal by nursing staff 5-7 days  The patient was brought into the OR, properly identified and positioned on the table  General anesthesia was administered and the patient was placed in lithotomy position and prepped and draped in the usual sterile fashion  Compression boots were employed  Intravenous antibiotic was administered  An appropriate time-out was performed  Cystoscopy was performed with a 25 Turkish cystoscope with findings as above  The right ureteral orifice was identified and the pre-existing stent externalized  A guidewire was placed up the stent under fluoroscopic guidance into the renal pelvis  The rigid ureteroscope was introduced and carefully advanced to the upper ureter just below the UPJ  No stone was identified  Contrast was injected to complete a retrograde pyelogram   A new guidewire was then placed  A 12-14 Burmese access sheath was placed over a wire  The other wire would serve as a safety wire  The flexible scope was placed thru a sheath and advanced to stone which was found  in the renal pelvis  The stone was pushed into a mid pole calyx  The Holmium laser was used on various settings to break up stone into small particles  Care was taken not to laser tissue  The stone was completely dusted  All  fragment appeared small enough to pass around the stent  The ureteroscope  was removed from the ureter in conjunction with the access sheath  No damage was noted to the ureter nor were there any ureteral stones  The cystoscope was used to place a 6F Contour VL stent in the ureter, with the upper coils in the renal pelvis, and the distal coil in the bladder  Retrograde pyelogram on that side confirmed good position and no extravasation of contrast      The patient was awaken from anesthesia and taken to the PACU in good condition     I was present for the entire procedure    Patient Disposition:  PACU       SIGNATURE: Carlos Mcgowan MD  DATE: February 14, 2022  TIME: 3:52 PM

## 2022-02-14 NOTE — ANESTHESIA POSTPROCEDURE EVALUATION
Post-Op Assessment Note    CV Status:  Stable  Pain Score: 3    Pain management: adequate     Mental Status:  Alert and awake   Hydration Status:  Stable   PONV Controlled:  None   Airway Patency:  Patent      Post Op Vitals Reviewed: Yes      Staff: CRNA         No complications documented      /61 (02/14/22 1525)    Temp 97 8 °F (36 6 °C) (02/14/22 1525)    Pulse 85 (02/14/22 1525)   Resp 16 (02/14/22 1525)    SpO2 97 % (02/14/22 1525)

## 2022-02-14 NOTE — H&P
HISTORY AND PHYSICAL  ? ? Patient Name: Jaison Jama  Patient MRN: [id number]  Attending Provider: Josias Sellers MD  Service: Urology  Chief Complaint    Right Ureteral stone    HPI   Jaison Jama is a 19 y o  male with right ureteral stone  Pt was stented 1/24/22 for significant leukocytosis  Urine culture was negative  I plan cysto, right retro, ureteroscopy with possible laser lithotripsy and right stent exchange  Potential risks and complications discussed, and informed consent was given by the patient  Medications  Meds/Allergies   lactated ringers, 50 mL/hr        Prior to Admission Medications   Prescriptions Last Dose Informant Patient Reported?  Taking?   naproxen (EC NAPROSYN) 500 MG EC tablet 2/14/2022 at Unknown time  No Yes   Sig: Take 1 tablet (500 mg total) by mouth 2 (two) times a day with meals for 14 days   pantoprazole (PROTONIX) 20 mg tablet 2/14/2022 at Unknown time  No Yes   Sig: Take 1 tablet (20 mg total) by mouth daily for 14 days Take on the day that you take the naproxen   phenazopyridine (PYRIDIUM) 100 mg tablet Past Week at Unknown time  No Yes   Sig: Take 1 tablet (100 mg total) by mouth 3 (three) times a day with meals   tamsulosin (FLOMAX) 0 4 mg 2/13/2022 at Unknown time  No Yes   Sig: Take 1 capsule (0 4 mg total) by mouth daily with dinner for 7 days      Facility-Administered Medications: None       Current Facility-Administered Medications:     ceFAZolin (ANCEF) IVPB (premix in dextrose) 2,000 mg 50 mL, 2,000 mg, Intravenous, Once, Josias Sellers MD    lactated ringers infusion, 50 mL/hr, Intravenous, Continuous, Marta Sutton MD  Review of Systems  10 point review of systems negative except as noted in HPI  Allergies  No Known Allergies  PMH  Past Medical History:   Diagnosis Date    Kidney stones      Past surgical history  Past Surgical History:   Procedure Laterality Date    FL RETROGRADE PYELOGRAM  1/24/2022    KIDNEY STONE SURGERY      [place] CYSTOURETHROSCOPY,URETER CATHETER Right 1/24/2022    Procedure: CYSTOSCOPY RETROGRADE PYELOGRAM WITH INSERTION STENT URETERAL;  Surgeon: Shantel Bob MD;  Location: [facility];  Service: Urology     Social history  Social History     Tobacco Use    Smoking status: Current Every Day Smoker     Packs/day: 0 50     Types: Cigarettes    Smokeless tobacco: Never Used   Substance Use Topics    Alcohol use: Yes    Drug use: Not Currently     Types: Marijuana     ? Physical Exam      /83   Pulse 100   Temp 97 8 °F (36 6 °C) (Temporal)   Resp 16   Ht 5' 5" (1 651 m)   Wt 81 2 kg (179 lb)   SpO2 96%   BMI 29 79 kg/m²   General appearance: alert and oriented, in no acute distress  Head: Normocephalic, without obvious abnormality, atraumatic  Neck: no JVD and supple, symmetrical, trachea midline  Back: symmetric, no curvature  ROM normal  No CVA tenderness    Lungs: clear to auscultation bilaterally  Heart: regular rate and rhythm  Abdomen: soft, non-tender; bowel sounds normal; no masses,  no organomegaly  Male genitalia: normal  Extremities: extremities normal, warm and well-perfused; no cyanosis, clubbing, or edema  Neurologic: Grossly normal     Kaleigh Ocasio MD

## 2022-02-14 NOTE — NURSING NOTE
Pt is awake,alert,tolerated diet, seen and instructed earlier by Dr Mariely Phillips, written and verbal instructions given, pt verbalizes an understanding and voices no questions or complaints  Voided 150 cc of clear pyridium colored urine  Same strained, no calculi obtained

## 2022-02-14 NOTE — DISCHARGE INSTR - AVS FIRST PAGE
Drink plenty of fluids  Tylenol and ibuprofen can be used for discomfort  The Toradol prescription prescribed can be used for more severe pain  Pyridium will help urinary burning and spasm  It will turn urine deep orange  Ureteral stents are placed at the time of surgery to help keep the ureter opened and draining and allow it to heal    They do need to be removed or changed at intervals  to prevent future kidney damage  It is normal to have bladder irritability from the stent  The symptoms include urgency and frequency  Blood in the urine is also common  Urinary bleeding can come and go and is generally of no significance  It is also very normal to have back pain on the side of the stent when you urinate  Please call the office for fever, heavy bleeding with clots and difficulty voiding, difficulty voiding and severe pain that is not relieved by pain medication prescribed

## 2022-02-14 NOTE — TELEPHONE ENCOUNTER
Patient underwent ureteroscopy today February 14  Stent can be removed in 5-7 days by the nursing staff  He will then need a follow-up 6 weeks post stent removal with a renal ultrasound just prior  Please call to arrange

## 2022-02-14 NOTE — INTERVAL H&P NOTE
H&P reviewed  After examining the patient I find no changes in the patients condition since the H&P had been written  Vitals:    02/14/22 1208   BP: 140/83   Pulse: 100   Resp: 16   Temp: 97 8 °F (36 6 °C)   SpO2: 96%   No fever or dysuria  Procedure reviewed with the patient in the holding unit  Risks discussed and informed consent obtained  Consent signed  Laterality marked - Right

## 2022-02-15 NOTE — TELEPHONE ENCOUNTER
Post Op Note    Carmen Vo is a 19 y o  male s/pCYSTOSCOPY URETEROSCOPY WITH LITHOTRIPSY HOLMIUM LASER, RETROGRADE PYELOGRAM AND INSERTION STENT URETERAL (Right Bladder)  performed 2-  Carmen Vo is a patient of Dr Leoncio Sotelo and is seen at the [facility] office  How would you rate your pain on a scale from 1 to 10, 10 being the worst pain ever? 2  Have you had a fever? No  Do you have any difficulty urinating? No    Do you have any other questions or concerns that I can address at this time? Call placed to patient and spoke with him  Pt stated that he is doing well s/p procedure  His pain is managed by OTC medication and he is urinating without difficulty  Pt is scheduled in the office on 2- at 11:30am for stent with string removal  Pt confirmed this appointment  He is aware he can contact the office with any questions or concerns he should have prior to his appointment

## 2022-02-21 ENCOUNTER — PROCEDURE VISIT (OUTPATIENT)
Dept: UROLOGY | Facility: MEDICAL CENTER | Age: 22
End: 2022-02-21

## 2022-02-21 VITALS
BODY MASS INDEX: 29.16 KG/M2 | SYSTOLIC BLOOD PRESSURE: 156 MMHG | DIASTOLIC BLOOD PRESSURE: 98 MMHG | HEART RATE: 98 BPM | WEIGHT: 175 LBS | HEIGHT: 65 IN

## 2022-02-21 DIAGNOSIS — N13.2 HYDRONEPHROSIS WITH URINARY OBSTRUCTION DUE TO RENAL CALCULUS: Primary | ICD-10-CM

## 2022-02-21 PROCEDURE — 99024 POSTOP FOLLOW-UP VISIT: CPT

## 2022-02-21 NOTE — TELEPHONE ENCOUNTER
Patient said his stent is really bothering him, he would like a call back to see if he can get it removed today

## 2022-02-21 NOTE — TELEPHONE ENCOUNTER
Patient called that he has not received a call back yet to see if he can come in today to get the stent removed  He stated it is really bothering him

## 2022-02-21 NOTE — PROGRESS NOTES
2/21/2022  Cherry Matias is a 19 y o  male  [id number]        Diagnosis  Chief Complaint     Hydronephrosis w/urinary obstruction due to renal calculus          Patient is s/p right ureteroscopy with stone extraction on 2/14/22 with Dr Ellie Hernandez with [facility] in 6 weeks with renal US prior      Procedure Stent with String Removal    Vitals:    02/21/22 1316   BP: 156/98   Pulse: 98   Weight: 79 4 kg (175 lb)   Height: 5' 5" (1 651 m)       Stent with string removed intact without difficulty  Reviewed post stent removal symptoms including flank pain, dysuria, and hematuria  Instructed patient to increase oral fluid intake  Encouraged the use of NSAIDS and other prescribed pain medication as needed for discomfort  Patient instructed to call the office or report to the ER for uncontrolled pain, fever, chills, nausea or vomiting         Neisha Brownlee RN

## 2022-02-21 NOTE — TELEPHONE ENCOUNTER
Per Dr Genevieve Marie stent can come out today s/p placement on 2/14/22  Pt states he is closest to [facility] office  Will check to see if they can see pt today  Otherwise will try another office

## (undated) DEVICE — PACK TUR

## (undated) DEVICE — STERILE POLYISOPRENE POWDER-FREE SURGICAL GLOVES: Brand: PROTEXIS

## (undated) DEVICE — 4-PORT MANIFOLD: Brand: NEPTUNE 2

## (undated) DEVICE — PREMIUM DRY TRAY LF: Brand: MEDLINE INDUSTRIES, INC.

## (undated) DEVICE — GLOVE SRG LF STRL BGL SKNSNS 6.5 PF

## (undated) DEVICE — LUBRICANT SURGILUBE TUBE 4 OZ  FLIP TOP

## (undated) DEVICE — INVIEW CLEAR LEGGINGS: Brand: CONVERTORS

## (undated) DEVICE — NEEDLE BLUNT 18 G X 1 1/2IN

## (undated) DEVICE — SHEATH URETERAL ACCESS 12/14FR 35CM PROXIS

## (undated) DEVICE — GUIDEWIRE STRGHT TIP 0.035 IN  SOLO PLUS

## (undated) DEVICE — UROCATCH BAG

## (undated) DEVICE — STERILE CYSTO PACK: Brand: CARDINAL HEALTH

## (undated) DEVICE — ENDOSCOPIC VALVE WITH ADAPTER.: Brand: SURSEAL® II

## (undated) DEVICE — SCD SEQUENTIAL COMPRESSION COMFORT SLEEVE MEDIUM KNEE LENGTH: Brand: KENDALL SCD

## (undated) DEVICE — GUIDEWIRE STRGHT TIP 0.038 IN SOLO PLUS

## (undated) DEVICE — FIBER HOLMIUM 200

## (undated) DEVICE — CHLORHEXIDINE 4PCT 4 OZ

## (undated) DEVICE — GLOVE SRG BIOGEL 7

## (undated) DEVICE — TUBING SUCTION 5MM X 12 FT

## (undated) DEVICE — 3M™ TEGADERM™ TRANSPARENT FILM DRESSING FRAME STYLE, 1624W, 2-3/8 IN X 2-3/4 IN (6 CM X 7 CM), 100/CT 4CT/CASE: Brand: 3M™ TEGADERM™

## (undated) DEVICE — TELFA NON-ADHERENT ABSORBENT DRESSING: Brand: TELFA

## (undated) DEVICE — SPECIMEN CONTAINER STERILE PEEL PACK